# Patient Record
Sex: FEMALE | Race: WHITE | NOT HISPANIC OR LATINO | Employment: OTHER | URBAN - METROPOLITAN AREA
[De-identification: names, ages, dates, MRNs, and addresses within clinical notes are randomized per-mention and may not be internally consistent; named-entity substitution may affect disease eponyms.]

---

## 2016-07-06 LAB — PAP SMEAR: NORMAL

## 2017-02-17 RX ORDER — HYDROCHLOROTHIAZIDE 25 MG/1
25 TABLET ORAL DAILY
Qty: 90 TABLET | Refills: 2 | Status: SHIPPED | OUTPATIENT
Start: 2017-02-17 | End: 2017-12-08 | Stop reason: SDUPTHER

## 2017-02-20 ENCOUNTER — OFFICE VISIT (OUTPATIENT)
Dept: FAMILY MEDICINE | Facility: CLINIC | Age: 42
End: 2017-02-20
Payer: COMMERCIAL

## 2017-02-20 VITALS
WEIGHT: 223.75 LBS | TEMPERATURE: 98 F | RESPIRATION RATE: 20 BRPM | DIASTOLIC BLOOD PRESSURE: 85 MMHG | HEIGHT: 64 IN | BODY MASS INDEX: 38.2 KG/M2 | SYSTOLIC BLOOD PRESSURE: 127 MMHG | HEART RATE: 91 BPM

## 2017-02-20 DIAGNOSIS — E78.5 HYPERLIPIDEMIA, UNSPECIFIED HYPERLIPIDEMIA TYPE: ICD-10-CM

## 2017-02-20 DIAGNOSIS — E88.819 INSULIN RESISTANCE: ICD-10-CM

## 2017-02-20 DIAGNOSIS — F41.0 PANIC ATTACK: ICD-10-CM

## 2017-02-20 DIAGNOSIS — I10 ESSENTIAL HYPERTENSION: ICD-10-CM

## 2017-02-20 DIAGNOSIS — Q61.3 POLYCYSTIC KIDNEY DISEASE: ICD-10-CM

## 2017-02-20 DIAGNOSIS — F33.1 MAJOR DEPRESSIVE DISORDER, RECURRENT, MODERATE: ICD-10-CM

## 2017-02-20 PROCEDURE — 99214 OFFICE O/P EST MOD 30 MIN: CPT | Mod: S$GLB,,, | Performed by: INTERNAL MEDICINE

## 2017-02-20 RX ORDER — ALPRAZOLAM 0.5 MG/1
0.5 TABLET ORAL DAILY PRN
Qty: 20 TABLET | Refills: 0 | Status: SHIPPED | OUTPATIENT
Start: 2017-02-20 | End: 2017-03-30 | Stop reason: SDUPTHER

## 2017-02-20 RX ORDER — BUPROPION HYDROCHLORIDE 150 MG/1
150 TABLET ORAL DAILY
Qty: 30 TABLET | Refills: 11 | Status: SHIPPED | OUTPATIENT
Start: 2017-02-20 | End: 2017-03-20
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
## 2017-03-03 ENCOUNTER — PATIENT OUTREACH (OUTPATIENT)
Dept: ADMINISTRATIVE | Facility: HOSPITAL | Age: 42
End: 2017-03-03

## 2017-03-03 NOTE — PROGRESS NOTES
PRE-VISIT CHART REVIEW    Appointment Scheduled on 3/20/17    Department stratifications & guidelines reviewed:yes    Target Chronic Diagnosis: HTN, obesity    Chronic Diagnosis Intervention Due: no    Goals Updated:No    Health Maintenance Due   Topic Date Due    TETANUS VACCINE  03/13/1993       Advanced Directives:   65 years of age or older?  No  Directive on file?  N\A                                      Pre-visit patient communication: 03/03/2017 MyChart/Letter    Studies or screenings scheduled pre-visit: no    Educate patient on HTN (127/85), obesity (38.41)

## 2017-03-07 ENCOUNTER — PATIENT MESSAGE (OUTPATIENT)
Dept: ADMINISTRATIVE | Facility: HOSPITAL | Age: 42
End: 2017-03-07

## 2017-03-07 ENCOUNTER — OFFICE VISIT (OUTPATIENT)
Dept: PSYCHIATRY | Facility: CLINIC | Age: 42
End: 2017-03-07
Payer: COMMERCIAL

## 2017-03-07 DIAGNOSIS — F41.0 PANIC ATTACKS: ICD-10-CM

## 2017-03-07 DIAGNOSIS — F43.0 ACUTE STRESS DISORDER: Primary | ICD-10-CM

## 2017-03-07 PROCEDURE — 90791 PSYCH DIAGNOSTIC EVALUATION: CPT | Mod: S$GLB,,, | Performed by: PSYCHOLOGIST
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
## 2017-03-14 ENCOUNTER — CLINICAL SUPPORT (OUTPATIENT)
Dept: FAMILY MEDICINE | Facility: CLINIC | Age: 42
End: 2017-03-14
Payer: COMMERCIAL

## 2017-03-14 ENCOUNTER — APPOINTMENT (OUTPATIENT)
Dept: LAB | Facility: HOSPITAL | Age: 42
End: 2017-03-14
Attending: INTERNAL MEDICINE
Payer: COMMERCIAL

## 2017-03-14 DIAGNOSIS — F33.1 MAJOR DEPRESSIVE DISORDER, RECURRENT, MODERATE: ICD-10-CM

## 2017-03-14 DIAGNOSIS — I10 ESSENTIAL HYPERTENSION: ICD-10-CM

## 2017-03-14 DIAGNOSIS — E88.819 INSULIN RESISTANCE: ICD-10-CM

## 2017-03-14 DIAGNOSIS — Q61.3 POLYCYSTIC KIDNEY DISEASE: ICD-10-CM

## 2017-03-14 LAB
ALBUMIN SERPL BCP-MCNC: 3.8 G/DL
ALP SERPL-CCNC: 72 U/L
ALT SERPL W/O P-5'-P-CCNC: 15 U/L
ANION GAP SERPL CALC-SCNC: 11 MMOL/L
AST SERPL-CCNC: 14 U/L
BASOPHILS # BLD AUTO: 0 K/UL
BASOPHILS NFR BLD: 0.4 %
BILIRUB SERPL-MCNC: 0.7 MG/DL
BUN SERPL-MCNC: 16 MG/DL
CALCIUM SERPL-MCNC: 9.8 MG/DL
CHLORIDE SERPL-SCNC: 104 MMOL/L
CHOLEST/HDLC SERPL: 5.1 {RATIO}
CO2 SERPL-SCNC: 26 MMOL/L
CREAT SERPL-MCNC: 1.3 MG/DL
DIFFERENTIAL METHOD: ABNORMAL
EOSINOPHIL # BLD AUTO: 0.1 K/UL
EOSINOPHIL NFR BLD: 1.5 %
ERYTHROCYTE [DISTWIDTH] IN BLOOD BY AUTOMATED COUNT: 16.1 %
EST. GFR  (AFRICAN AMERICAN): 58 ML/MIN/1.73 M^2
EST. GFR  (NON AFRICAN AMERICAN): 51 ML/MIN/1.73 M^2
GLUCOSE SERPL-MCNC: 122 MG/DL
HCT VFR BLD AUTO: 35.3 %
HDL/CHOLESTEROL RATIO: 19.5 %
HDLC SERPL-MCNC: 195 MG/DL
HDLC SERPL-MCNC: 38 MG/DL
HGB BLD-MCNC: 11.4 G/DL
LDLC SERPL CALC-MCNC: 133.4 MG/DL
LYMPHOCYTES # BLD AUTO: 1.5 K/UL
LYMPHOCYTES NFR BLD: 20.4 %
MCH RBC QN AUTO: 26.3 PG
MCHC RBC AUTO-ENTMCNC: 32.4 %
MCV RBC AUTO: 81 FL
MONOCYTES # BLD AUTO: 0.9 K/UL
MONOCYTES NFR BLD: 11.9 %
NEUTROPHILS # BLD AUTO: 4.8 K/UL
NEUTROPHILS NFR BLD: 65.8 %
NONHDLC SERPL-MCNC: 157 MG/DL
PLATELET # BLD AUTO: 302 K/UL
PMV BLD AUTO: 10.1 FL
POTASSIUM SERPL-SCNC: 3.4 MMOL/L
PROT SERPL-MCNC: 7.6 G/DL
RBC # BLD AUTO: 4.35 M/UL
SODIUM SERPL-SCNC: 141 MMOL/L
TRIGL SERPL-MCNC: 118 MG/DL
TSH SERPL DL<=0.005 MIU/L-ACNC: 2.7 UIU/ML
WBC # BLD AUTO: 7.3 K/UL

## 2017-03-14 PROCEDURE — 80061 LIPID PANEL: CPT

## 2017-03-14 PROCEDURE — 80053 COMPREHEN METABOLIC PANEL: CPT

## 2017-03-14 PROCEDURE — 85025 COMPLETE CBC W/AUTO DIFF WBC: CPT

## 2017-03-14 PROCEDURE — 84443 ASSAY THYROID STIM HORMONE: CPT

## 2017-03-14 PROCEDURE — 83036 HEMOGLOBIN GLYCOSYLATED A1C: CPT

## 2017-03-15 LAB
ESTIMATED AVG GLUCOSE: 128 MG/DL
HBA1C MFR BLD HPLC: 6.1 %

## 2017-03-20 ENCOUNTER — OFFICE VISIT (OUTPATIENT)
Dept: FAMILY MEDICINE | Facility: CLINIC | Age: 42
End: 2017-03-20
Payer: COMMERCIAL

## 2017-03-20 VITALS
HEIGHT: 64 IN | OXYGEN SATURATION: 98 % | DIASTOLIC BLOOD PRESSURE: 74 MMHG | WEIGHT: 225.5 LBS | TEMPERATURE: 98 F | BODY MASS INDEX: 38.5 KG/M2 | RESPIRATION RATE: 18 BRPM | SYSTOLIC BLOOD PRESSURE: 115 MMHG | HEART RATE: 79 BPM

## 2017-03-20 DIAGNOSIS — F41.0 PANIC ATTACK: ICD-10-CM

## 2017-03-20 DIAGNOSIS — F43.0 ACUTE STRESS DISORDER: Primary | ICD-10-CM

## 2017-03-20 DIAGNOSIS — F32.0 MAJOR DEPRESSIVE DISORDER, SINGLE EPISODE, MILD: ICD-10-CM

## 2017-03-20 DIAGNOSIS — B00.2 HERPES VIRUS INFECTION OF ORAL MUCOSA: ICD-10-CM

## 2017-03-20 DIAGNOSIS — D50.9 IRON DEFICIENCY ANEMIA, UNSPECIFIED IRON DEFICIENCY ANEMIA TYPE: ICD-10-CM

## 2017-03-20 DIAGNOSIS — E78.5 HYPERLIPIDEMIA, UNSPECIFIED HYPERLIPIDEMIA TYPE: ICD-10-CM

## 2017-03-20 PROCEDURE — 99214 OFFICE O/P EST MOD 30 MIN: CPT | Mod: S$GLB,,, | Performed by: INTERNAL MEDICINE

## 2017-03-20 RX ORDER — FERROUS SULFATE 325(65) MG
325 TABLET ORAL
Refills: 0 | COMMUNITY
Start: 2017-03-20 | End: 2018-12-21

## 2017-03-20 RX ORDER — ROSUVASTATIN CALCIUM 10 MG/1
10 TABLET, COATED ORAL DAILY
Qty: 90 TABLET | Refills: 3 | Status: SHIPPED | OUTPATIENT
Start: 2017-03-20 | End: 2018-06-11 | Stop reason: SDUPTHER

## 2017-03-20 RX ORDER — VALACYCLOVIR HYDROCHLORIDE 1 G/1
1000 TABLET, FILM COATED ORAL 2 TIMES DAILY
Qty: 14 TABLET | Refills: 3 | Status: SHIPPED | OUTPATIENT
Start: 2017-03-20 | End: 2018-03-12 | Stop reason: SDUPTHER

## 2017-03-20 RX ORDER — BUPROPION HYDROCHLORIDE 300 MG/1
300 TABLET ORAL DAILY
Qty: 30 TABLET | Refills: 11 | Status: SHIPPED | OUTPATIENT
Start: 2017-03-20 | End: 2017-03-25

## 2017-03-20 NOTE — PROGRESS NOTES
Subjective:       Patient ID: Anyi Moore is a 42 y.o. female.    Current Outpatient Prescriptions   Medication Sig Dispense Refill    alprazolam (XANAX) 0.5 MG tablet Take 1 tablet (0.5 mg total) by mouth daily as needed for Anxiety. Panic attacks 20 tablet 0    diphenhydrAMINE (BENADRYL) 25 mg capsule Take 1 each (25 mg total) by mouth every 6 (six) hours as needed for Itching.  0    fish oil-omega-3 fatty acids 300-1,000 mg capsule Take 2 g by mouth once daily.      hydrochlorothiazide (HYDRODIURIL) 25 MG tablet Take 1 tablet (25 mg total) by mouth once daily. 90 tablet 2    labetalol (NORMODYNE) 100 MG tablet Take 100 mg by mouth 2 (two) times daily.      losartan (COZAAR) 50 MG tablet Take 1 tablet (50 mg total) by mouth once daily. 90 tablet 3    metformin (GLUCOPHAGE) 500 MG tablet Take 1 tablet (500 mg total) by mouth daily with breakfast. 90 tablet 3    multivitamin (THERAGRAN) per tablet Take 1 tablet by mouth once daily.      buPROPion (WELLBUTRIN XL) 300 MG 24 hr tablet Take 1 tablet (300 mg total) by mouth once daily. 30 tablet 11    ferrous sulfate 325 mg (65 mg iron) Tab tablet Take 1 tablet (325 mg total) by mouth daily with breakfast.  0    rosuvastatin (CRESTOR) 10 MG tablet Take 1 tablet (10 mg total) by mouth once daily. 90 tablet 3    valacyclovir (VALTREX) 1000 MG tablet Take 1 tablet (1,000 mg total) by mouth 2 (two) times daily. 14 tablet 3     No current facility-administered medications for this visit.      Chief Complaint: Follow-up, 4 week and Results, Medication Management  She is here today to f/u on her severe anxiety and depression due to an acute stress in her family.  She is now followed by psychology.  She was started on wellbutrin 4 weeks ago and says it is helping a little.  She is still very anxious and cries all the time.  No suicidal ideations.  She is sleeping all the time and will overeat then not eat at all.  She had only one panic attack that last for  "about 10 minutes and resolved after taking xanax.  She is still living apart from her  and 2 adopted sons (she is living with her 10 yo daughter at her parent's house).      She has labs done and noted to have elevated lipids to 195/118/38/133 despite taking red anibal yeast daily.      She was noted to be anemic with H+H of 11/35 due to heavy periods. This is stable. She is not currently taking iron.      Over the weekend she developed a herpes cold sore on her lower lip that she took some left over valtrex. It resolved in 3 days and she would like a refill on hand incase she gets another lesion.      Review of Systems   Constitutional: Negative for appetite change, fatigue, fever and unexpected weight change.   HENT: Negative for congestion, ear pain, hearing loss, sore throat and trouble swallowing.    Eyes: Negative for pain and visual disturbance.   Respiratory: Negative for cough, chest tightness, shortness of breath and wheezing.    Cardiovascular: Negative for chest pain, palpitations and leg swelling.   Gastrointestinal: Negative for abdominal pain, blood in stool, constipation, diarrhea, nausea and vomiting.   Endocrine: Negative for polyuria.   Genitourinary: Negative for dysuria and hematuria.   Musculoskeletal: Negative for arthralgias, back pain and myalgias.   Skin: Negative for rash.   Neurological: Negative for dizziness, weakness, numbness and headaches.   Hematological: Does not bruise/bleed easily.   Psychiatric/Behavioral: Positive for dysphoric mood and sleep disturbance. Negative for suicidal ideas. The patient is nervous/anxious.        Objective:      Vitals:    03/20/17 0846   BP: 115/74   BP Location: Right arm   Patient Position: Sitting   BP Method: Manual   Pulse: 79   Resp: 18   Temp: 98.3 °F (36.8 °C)   TempSrc: Oral   SpO2: 98%   Weight: 102.3 kg (225 lb 8.5 oz)   Height: 5' 4" (1.626 m)     Body mass index is 38.71 kg/(m^2).  Physical Exam    General appearance: No acute " distress, cooperative  Neck: FROM, soft, supple, no thyromegaly  Lymph: no anterior or posterior cervical adenopathy  Heart::  Regular rate and rhythm, no murmur  Lung: Clear to ascultation bilaterally, no wheezing, no rales, no rhonchi, no distress  Abdomen: Soft, nontender, no distention, no hepatosplenomegaly, bowel sounds normal, no guarding, no rebound, no peritoneal signs  Skin: no rashes, mild lesion on lower lip on right healing  Extremities: no edema, no cyanosis  Peripheral pulses: 2+ pedal pulses bilaterally, good perfusion and color    Assessment:       1. Acute stress disorder    2. Major depressive disorder, single episode, mild    3. Panic attack    4. Iron deficiency anemia, unspecified iron deficiency anemia type    5. Hyperlipidemia, unspecified hyperlipidemia type    6. Herpes virus infection of oral mucosa        Plan:       Acute stress disorder with major depression and panic attacks  Uncontrolled but mildly improved. Will increase wellbutrin to 300 mg qday.  Recheck by email/phone in about one month. She will call me if any issues.  She is using xanax only for panic attacks and does not need a refill today.    -     buPROPion (WELLBUTRIN XL) 300 MG 24 hr tablet; Take 1 tablet (300 mg total) by mouth once daily.  Dispense: 30 tablet; Refill: 11    Iron deficiency anemia, unspecified iron deficiency anemia type  Stable but still low and will start OTC iron supplements and recheck studies in 3 months.   -     CBC auto differential; Future; Expected date: 6/20/17  -     Iron and TIBC; Future; Expected date: 6/20/17  -     Ferritin; Future; Expected date: 6/20/17  -     ferrous sulfate 325 mg (65 mg iron) Tab tablet; Take 1 tablet (325 mg total) by mouth daily with breakfast.; Refill: 0    Hyperlipidemia, unspecified hyperlipidemia type  Elevated with LDL of 138.  Will start crestor and recheck in 3 months.   -     rosuvastatin (CRESTOR) 10 MG tablet; Take 1 tablet (10 mg total) by mouth once  daily.  Dispense: 90 tablet; Refill: 3  -     Lipid panel; Future; Expected date: 6/20/17  -     Comprehensive metabolic panel; Future; Expected date: 6/20/17    Herpes virus infection of oral mucosa  Oral lesion is healing. Rx given to take for next outbreak.    -     valacyclovir (VALTREX) 1000 MG tablet; Take 1 tablet (1,000 mg total) by mouth 2 (two) times daily.  Dispense: 14 tablet; Refill: 3    Return in about 3 months (around 6/20/2017) for chronic medical issues.

## 2017-03-21 ENCOUNTER — TELEPHONE (OUTPATIENT)
Dept: FAMILY MEDICINE | Facility: CLINIC | Age: 42
End: 2017-03-21

## 2017-03-21 NOTE — TELEPHONE ENCOUNTER
Attempted to reach patient to schedule fasting labs and 3 month F/U with Dr. Valdez. No answer, LM including contact information for return call/scheduling.

## 2017-03-22 ENCOUNTER — PATIENT MESSAGE (OUTPATIENT)
Dept: FAMILY MEDICINE | Facility: CLINIC | Age: 42
End: 2017-03-22

## 2017-03-22 ENCOUNTER — OFFICE VISIT (OUTPATIENT)
Dept: PSYCHIATRY | Facility: CLINIC | Age: 42
End: 2017-03-22
Payer: COMMERCIAL

## 2017-03-22 DIAGNOSIS — F43.10 POST TRAUMATIC STRESS DISORDER (PTSD): ICD-10-CM

## 2017-03-22 DIAGNOSIS — F32.0 MAJOR DEPRESSIVE DISORDER, SINGLE EPISODE, MILD: Primary | ICD-10-CM

## 2017-03-22 DIAGNOSIS — F41.0 PANIC ATTACKS: ICD-10-CM

## 2017-03-22 PROCEDURE — 90834 PSYTX W PT 45 MINUTES: CPT | Mod: S$GLB,,, | Performed by: PSYCHOLOGIST

## 2017-03-22 NOTE — TELEPHONE ENCOUNTER
Attempted to reach patient via contact number provided, no answer. Patient is scheduled for 3 month F/U and fasting labs, notified patient via "[employer]t.

## 2017-03-23 PROBLEM — F43.10 POST TRAUMATIC STRESS DISORDER (PTSD): Status: ACTIVE | Noted: 2017-03-20

## 2017-03-23 NOTE — PROGRESS NOTES
Individual Psychotherapy (PhD/LCSW)    3/22/2017    Site:  [facility]         Therapeutic Intervention: Met with patient.  Outpatient - Insight oriented psychotherapy 45 min - CPT code 27648    Chief complaint/reason for encounter: depression and anxiety     Interval history and content of current session:  Ms. Moore arrived on time for her scheduled appointment.  She reports that her trauma-related symptoms have persisted.  Because her symptoms have persisted for greater than one month, they currently meet criteria for Posttraumatic Stress Disorder.  She continues to endorse intrusive symptoms, avoidance, negative alterations in cognitions and mood, and hyperarousal.  Additionally, it appears her depressive symptoms may have extended beyond the context of trauma to meet criteria for Major Depressive Disorder, single episode, mild.  She experiences depressed mood, anhedonia, lack of motivation, fatigue, and difficulty concentrating both related and unrelated to trauma.  She also continues to experience intermittent panic attacks; however, she does not meet criteria for Panic Disorder because she does not exhibit avoidance due to fear of future panic attacks.  She continues to live with her parents and her daughter.  She notes that it is very difficult for her because she feels lonely and unable to draw support from others due to the nature of the trauma.  She was provided with psychoeducation about PTSD, cognitive triad, and stuck points.  She generated a list of stuck points and was actively engaged in discussing a recent distressing event using the ABCD worksheet.  She found the worksheet helpful and was encouraged to do one worksheet per day.  She was also encouraged to engage in relaxation and self-care.  She notes that she will spend time with her  tonight and will focus on self-care.    Treatment plan:  · Target symptoms: depression, anxiety   · Why chosen therapy is appropriate versus another  modality: relevant to diagnosis, evidence based practice  · Outcome monitoring methods: self-report  · Therapeutic intervention type: insight oriented psychotherapy    Risk parameters:  Patient reports no suicidal ideation  Patient reports no homicidal ideation  Patient reports no self-injurious behavior  Patient reports no violent behavior    Verbal deficits: None    Patient's response to intervention:  The patient's response to intervention is accepting.    Progress toward goals and other mental status changes:  The patient's progress toward goals is fair .    Diagnosis:     ICD-10-CM ICD-9-CM   1. Major depressive disorder, single episode, mild F32.0 296.21   2. Post traumatic stress disorder (PTSD) F43.10 309.81   3. Panic attacks F41.0 300.01       Plan:  individual psychotherapy    Return to clinic: 2 weeks    Length of Service (minutes): 45

## 2017-03-24 ENCOUNTER — TELEPHONE (OUTPATIENT)
Dept: PSYCHIATRY | Facility: CLINIC | Age: 42
End: 2017-03-24

## 2017-03-24 ENCOUNTER — PATIENT MESSAGE (OUTPATIENT)
Dept: FAMILY MEDICINE | Facility: CLINIC | Age: 42
End: 2017-03-24
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
## 2017-03-24 NOTE — TELEPHONE ENCOUNTER
Called patient to offer appointment, no answer, left voicemail to contact office. Phone number provided. Thanks  Kari

## 2017-03-25 RX ORDER — CITALOPRAM 20 MG/1
20 TABLET, FILM COATED ORAL DAILY
Qty: 30 TABLET | Refills: 11 | Status: SHIPPED | OUTPATIENT
Start: 2017-03-25 | End: 2018-03-29

## 2017-03-30 DIAGNOSIS — F41.0 PANIC ATTACK: ICD-10-CM

## 2017-03-31 RX ORDER — ALPRAZOLAM 0.5 MG/1
0.5 TABLET ORAL DAILY PRN
Qty: 20 TABLET | Refills: 0 | Status: SHIPPED | OUTPATIENT
Start: 2017-03-31 | End: 2020-03-30 | Stop reason: SDUPTHER

## 2017-04-17 RX ORDER — LABETALOL 100 MG/1
100 TABLET, FILM COATED ORAL 2 TIMES DAILY
Qty: 180 TABLET | Refills: 2 | Status: SHIPPED | OUTPATIENT
Start: 2017-04-17 | End: 2018-04-26 | Stop reason: SDUPTHER

## 2017-04-17 RX ORDER — LOSARTAN POTASSIUM 50 MG/1
50 TABLET ORAL DAILY
Qty: 90 TABLET | Refills: 3 | Status: SHIPPED | OUTPATIENT
Start: 2017-04-17 | End: 2018-06-19 | Stop reason: SDUPTHER

## 2017-05-12 ENCOUNTER — OFFICE VISIT (OUTPATIENT)
Dept: OBSTETRICS AND GYNECOLOGY | Facility: CLINIC | Age: 42
End: 2017-05-12
Payer: COMMERCIAL

## 2017-05-12 VITALS
WEIGHT: 227.94 LBS | DIASTOLIC BLOOD PRESSURE: 85 MMHG | BODY MASS INDEX: 38.91 KG/M2 | HEIGHT: 64 IN | SYSTOLIC BLOOD PRESSURE: 126 MMHG | HEART RATE: 80 BPM

## 2017-05-12 DIAGNOSIS — N92.1 MENORRHAGIA WITH IRREGULAR CYCLE: Primary | ICD-10-CM

## 2017-05-12 PROCEDURE — 99212 OFFICE O/P EST SF 10 MIN: CPT | Mod: S$GLB,,, | Performed by: NURSE PRACTITIONER

## 2017-05-12 PROCEDURE — 99999 PR PBB SHADOW E&M-EST. PATIENT-LVL III: CPT | Mod: PBBFAC,,, | Performed by: NURSE PRACTITIONER

## 2017-05-16 NOTE — PROGRESS NOTES
Chief Complaint   Patient presents with    New Patient       History of Present Illness: Anyi Moore is a 42 y.o. female that presents today  for 5/12/17  Pt here c/o heavy bleeding with irregular cycles. She reports that she has been seen for this issue before and 2 years ago she started getting depo injections to help with the bleeding, etc. She was on the depo injections for 1 year and still had bleeding issues. She has now been off of the injections for 1 year and is ready to discuss more permamnent options to control her cycles. She reports that she gets a cycle about every 6 weeks or so and the bleeding is 5 days, with the first 3 days being extremely heavy. No other associated complaints. Pt denies any other complaints or concerns.       Chief Complaint   Patient presents with    New Patient         Past Medical History:   Diagnosis Date    Hypertension     Lymphadenopathy 7/29/2016    Polycystic kidney        Past Surgical History:   Procedure Laterality Date    DILATION AND CURETTAGE OF UTERUS      x4       Current Outpatient Prescriptions   Medication Sig Dispense Refill    citalopram (CELEXA) 20 MG tablet Take 1 tablet (20 mg total) by mouth once daily. 30 tablet 11    diphenhydrAMINE (BENADRYL) 25 mg capsule Take 1 each (25 mg total) by mouth every 6 (six) hours as needed for Itching.  0    ferrous sulfate 325 mg (65 mg iron) Tab tablet Take 1 tablet (325 mg total) by mouth daily with breakfast.  0    fish oil-omega-3 fatty acids 300-1,000 mg capsule Take 2 g by mouth once daily.      hydrochlorothiazide (HYDRODIURIL) 25 MG tablet Take 1 tablet (25 mg total) by mouth once daily. 90 tablet 2    labetalol (NORMODYNE) 100 MG tablet Take 1 tablet (100 mg total) by mouth 2 (two) times daily. 180 tablet 2    losartan (COZAAR) 50 MG tablet Take 1 tablet (50 mg total) by mouth once daily. 90 tablet 3    metformin (GLUCOPHAGE) 500 MG tablet Take 1 tablet (500 mg total) by mouth daily with  breakfast. 90 tablet 3    multivitamin (THERAGRAN) per tablet Take 1 tablet by mouth once daily.      rosuvastatin (CRESTOR) 10 MG tablet Take 1 tablet (10 mg total) by mouth once daily. 90 tablet 3    alprazolam (XANAX) 0.5 MG tablet Take 1 tablet (0.5 mg total) by mouth daily as needed for Anxiety. Panic attacks 20 tablet 0    valacyclovir (VALTREX) 1000 MG tablet Take 1 tablet (1,000 mg total) by mouth 2 (two) times daily. 14 tablet 3     No current facility-administered medications for this visit.        Review of patient's allergies indicates:   Allergen Reactions    Wellbutrin [bupropion hcl] Hives    Percocet [oxycodone-acetaminophen] Other (See Comments)     Causes confusion       Family History   Problem Relation Age of Onset    Adopted: Yes    Cancer Mother      lung    Early death Mother 46    Hypertension Mother     Kidney disease Mother      polycystic    No Known Problems Daughter     No Known Problems Father     No Known Problems Sister     No Known Problems Brother     No Known Problems Maternal Aunt     No Known Problems Maternal Uncle     No Known Problems Paternal Aunt     No Known Problems Paternal Uncle     Kidney disease Maternal Grandmother      polycystic    No Known Problems Maternal Grandfather     No Known Problems Paternal Grandmother     No Known Problems Paternal Grandfather        Social History   Substance Use Topics    Smoking status: Never Smoker    Smokeless tobacco: Never Used    Alcohol use No      Comment: social       OB History    Para Term  AB SAB TAB Ectopic Multiple Living   1 1        1      # Outcome Date GA Lbr Dionisio/2nd Weight Sex Delivery Anes PTL Lv   1 Para     F Vag-Spont             Review of Symptoms:  GENERAL: Denies weight gain or weight loss. Feeling well overall.   SKIN: Denies rash or lesions.   ABDOMEN: No abdominal pain, constipation, diarrhea, nausea, vomiting or rectal bleeding.   URINARY: No frequency, dysuria,  "hematuria, or burning on urination.      /85 (BP Location: Right arm, Patient Position: Sitting, BP Method: Automatic)  Pulse 80  Ht 5' 4" (1.626 m)  Wt 103.4 kg (227 lb 15.3 oz)  LMP 05/11/2017 (Approximate)  BMI 39.13 kg/m2    ASSESSMENT/PLAN:  Menorrhagia with irregular cycle          -Lengthy discussion with ptt about long term birth control options to control cycle vs ablation, etc... Pt reports that she has though about doing an IUD (mirena), but after much thought she would rather a more permanent option such as an ablation or possibly hysterectomy, which she states she has discussed with Dr. Ma before, but is now ready to move forward with one of these options. Informed pt that she would need a surgical consult with Dr. Ma at this point. Pt okay with this and verbalized understanding.     Follow-up:  RTC for appt with Dr. Ma  "

## 2017-05-22 ENCOUNTER — DOCUMENTATION ONLY (OUTPATIENT)
Dept: PSYCHIATRY | Facility: CLINIC | Age: 42
End: 2017-05-22

## 2017-06-05 ENCOUNTER — PATIENT OUTREACH (OUTPATIENT)
Dept: ADMINISTRATIVE | Facility: HOSPITAL | Age: 42
End: 2017-06-05

## 2017-06-14 ENCOUNTER — APPOINTMENT (OUTPATIENT)
Dept: LAB | Facility: HOSPITAL | Age: 42
End: 2017-06-14
Attending: INTERNAL MEDICINE
Payer: COMMERCIAL

## 2017-06-14 ENCOUNTER — CLINICAL SUPPORT (OUTPATIENT)
Dept: FAMILY MEDICINE | Facility: CLINIC | Age: 42
End: 2017-06-14
Payer: COMMERCIAL

## 2017-06-14 DIAGNOSIS — E78.5 HYPERLIPIDEMIA, UNSPECIFIED HYPERLIPIDEMIA TYPE: ICD-10-CM

## 2017-06-14 DIAGNOSIS — D50.9 IRON DEFICIENCY ANEMIA, UNSPECIFIED IRON DEFICIENCY ANEMIA TYPE: ICD-10-CM

## 2017-06-14 LAB
ALBUMIN SERPL BCP-MCNC: 3.7 G/DL
ALP SERPL-CCNC: 60 U/L
ALT SERPL W/O P-5'-P-CCNC: 17 U/L
ANION GAP SERPL CALC-SCNC: 10 MMOL/L
AST SERPL-CCNC: 14 U/L
BASOPHILS # BLD AUTO: 0 K/UL
BASOPHILS NFR BLD: 0.2 %
BILIRUB SERPL-MCNC: 0.5 MG/DL
BUN SERPL-MCNC: 17 MG/DL
CALCIUM SERPL-MCNC: 9.8 MG/DL
CHLORIDE SERPL-SCNC: 103 MMOL/L
CHOLEST/HDLC SERPL: 3.4 {RATIO}
CO2 SERPL-SCNC: 27 MMOL/L
CREAT SERPL-MCNC: 1.2 MG/DL
DIFFERENTIAL METHOD: ABNORMAL
EOSINOPHIL # BLD AUTO: 0.1 K/UL
EOSINOPHIL NFR BLD: 1.4 %
ERYTHROCYTE [DISTWIDTH] IN BLOOD BY AUTOMATED COUNT: 15.1 %
EST. GFR  (AFRICAN AMERICAN): >60 ML/MIN/1.73 M^2
EST. GFR  (NON AFRICAN AMERICAN): 56 ML/MIN/1.73 M^2
FERRITIN SERPL-MCNC: 16 NG/ML
GLUCOSE SERPL-MCNC: 114 MG/DL
HCT VFR BLD AUTO: 37.4 %
HDL/CHOLESTEROL RATIO: 29.1 %
HDLC SERPL-MCNC: 151 MG/DL
HDLC SERPL-MCNC: 44 MG/DL
HGB BLD-MCNC: 12.4 G/DL
IRON SERPL-MCNC: 49 UG/DL
LDLC SERPL CALC-MCNC: 76.8 MG/DL
LYMPHOCYTES # BLD AUTO: 2.3 K/UL
LYMPHOCYTES NFR BLD: 25.7 %
MCH RBC QN AUTO: 27.7 PG
MCHC RBC AUTO-ENTMCNC: 33 %
MCV RBC AUTO: 84 FL
MONOCYTES # BLD AUTO: 0.7 K/UL
MONOCYTES NFR BLD: 7.5 %
NEUTROPHILS # BLD AUTO: 5.9 K/UL
NEUTROPHILS NFR BLD: 65.2 %
NONHDLC SERPL-MCNC: 107 MG/DL
PLATELET # BLD AUTO: 282 K/UL
PMV BLD AUTO: 10.1 FL
POTASSIUM SERPL-SCNC: 3.9 MMOL/L
PROT SERPL-MCNC: 7.4 G/DL
RBC # BLD AUTO: 4.47 M/UL
SATURATED IRON: 11 %
SODIUM SERPL-SCNC: 140 MMOL/L
TOTAL IRON BINDING CAPACITY: 432 UG/DL
TRANSFERRIN SERPL-MCNC: 292 MG/DL
TRIGL SERPL-MCNC: 151 MG/DL
WBC # BLD AUTO: 9.1 K/UL

## 2017-06-14 PROCEDURE — 84466 ASSAY OF TRANSFERRIN: CPT

## 2017-06-14 PROCEDURE — 82728 ASSAY OF FERRITIN: CPT

## 2017-06-14 PROCEDURE — 80053 COMPREHEN METABOLIC PANEL: CPT

## 2017-06-14 PROCEDURE — 80061 LIPID PANEL: CPT

## 2017-06-14 PROCEDURE — 85025 COMPLETE CBC W/AUTO DIFF WBC: CPT

## 2017-06-19 ENCOUNTER — OFFICE VISIT (OUTPATIENT)
Dept: FAMILY MEDICINE | Facility: CLINIC | Age: 42
End: 2017-06-19
Payer: COMMERCIAL

## 2017-06-19 VITALS
DIASTOLIC BLOOD PRESSURE: 88 MMHG | WEIGHT: 227.31 LBS | TEMPERATURE: 99 F | BODY MASS INDEX: 38.81 KG/M2 | HEIGHT: 64 IN | SYSTOLIC BLOOD PRESSURE: 132 MMHG | OXYGEN SATURATION: 97 % | RESPIRATION RATE: 17 BRPM | HEART RATE: 62 BPM

## 2017-06-19 DIAGNOSIS — F32.0 MAJOR DEPRESSIVE DISORDER, SINGLE EPISODE, MILD: ICD-10-CM

## 2017-06-19 DIAGNOSIS — F43.10 POST TRAUMATIC STRESS DISORDER (PTSD): ICD-10-CM

## 2017-06-19 DIAGNOSIS — E88.819 INSULIN RESISTANCE: ICD-10-CM

## 2017-06-19 DIAGNOSIS — E66.9 OBESITY (BMI 35.0-39.9 WITHOUT COMORBIDITY): ICD-10-CM

## 2017-06-19 DIAGNOSIS — R73.09 ELEVATED GLUCOSE: ICD-10-CM

## 2017-06-19 DIAGNOSIS — N92.1 MENORRHAGIA WITH IRREGULAR CYCLE: ICD-10-CM

## 2017-06-19 DIAGNOSIS — E78.5 HYPERLIPIDEMIA, UNSPECIFIED HYPERLIPIDEMIA TYPE: ICD-10-CM

## 2017-06-19 DIAGNOSIS — D50.9 IRON DEFICIENCY ANEMIA, UNSPECIFIED IRON DEFICIENCY ANEMIA TYPE: ICD-10-CM

## 2017-06-19 DIAGNOSIS — Q61.3 POLYCYSTIC KIDNEY DISEASE: ICD-10-CM

## 2017-06-19 DIAGNOSIS — I10 ESSENTIAL HYPERTENSION: Primary | ICD-10-CM

## 2017-06-19 PROCEDURE — 99214 OFFICE O/P EST MOD 30 MIN: CPT | Mod: S$GLB,,, | Performed by: INTERNAL MEDICINE

## 2017-06-19 NOTE — PROGRESS NOTES
Subjective:       Patient ID: Anyi Moore is a 42 y.o. female.    Current Outpatient Prescriptions   Medication Sig Dispense Refill    alprazolam (XANAX) 0.5 MG tablet Take 1 tablet (0.5 mg total) by mouth daily as needed for Anxiety. Panic attacks 20 tablet 0    citalopram (CELEXA) 20 MG tablet Take 1 tablet (20 mg total) by mouth once daily. 30 tablet 11    ferrous sulfate 325 mg (65 mg iron) Tab tablet Take 1 tablet (325 mg total) by mouth daily with breakfast.  0    hydrochlorothiazide (HYDRODIURIL) 25 MG tablet Take 1 tablet (25 mg total) by mouth once daily. 90 tablet 2    labetalol (NORMODYNE) 100 MG tablet Take 1 tablet (100 mg total) by mouth 2 (two) times daily. (Patient taking differently: Take 100 mg by mouth once daily at 6am. ) 180 tablet 2    losartan (COZAAR) 50 MG tablet Take 1 tablet (50 mg total) by mouth once daily. 90 tablet 3    metformin (GLUCOPHAGE) 500 MG tablet Take 1 tablet (500 mg total) by mouth daily with breakfast. 90 tablet 3    multivitamin (THERAGRAN) per tablet Take 1 tablet by mouth once daily.      rosuvastatin (CRESTOR) 10 MG tablet Take 1 tablet (10 mg total) by mouth once daily. 90 tablet 3    diphenhydrAMINE (BENADRYL) 25 mg capsule Take 1 each (25 mg total) by mouth every 6 (six) hours as needed for Itching.  0    fish oil-omega-3 fatty acids 300-1,000 mg capsule Take 2 g by mouth once daily.      valacyclovir (VALTREX) 1000 MG tablet Take 1 tablet (1,000 mg total) by mouth 2 (two) times daily. 14 tablet 3     No current facility-administered medications for this visit.      Chief Complaint: Follow-up and Weight Gain  She is here today to f/u on chronic medical issues.     SHe was started on celexa 20 mg qday for depression and PTSD. She is following with psychiatry. She says she is doing very well. She feels her depression is improving.  She is sleeping well. She is not having panic attacks and has not needed for xanax.     She has a history of  hypertension due to PCKD. She has been on treatment for HTN for some time and is controlled on current regimen of labetalol, telmisartan, and HCTZ.  She started taking her labetolol daily.  Her PCKD was dx at age 32. She has had u/s in the past and is followed by Dr. Alvarez but has not been seen in over two yeasr. Per patient her renal function is still normal. She denies any pain or hematuria. Her MGM has PCKD.       She has mild hyperlipidemia with last lipids were 151/151/44/76 on crestor 10 mg qday.  She is tolerating well without any side effects.       She has insulin resistance with HbA1c of 6.1 on 3/2017 due to her PCKD. She continues on metformin once a day.  She does not check her glucose.         She has DWAIN due to heavy frequent periods. She was seen by gyn who recommended ablation vs REYNA. She has scheduled an ablation for this Fall.  Her most recent iron studies show iron sat of 11 and ferritin of 16 with normal H+H.  She is taking iron once a day but continues with heavy periods.        Pap---2015  Mammogram ---10/2016 neg  Tdap----at work  Influenza vaccine---10/2016.     Review of Systems   Constitutional: Negative for appetite change, fatigue, fever and unexpected weight change.   HENT: Negative for congestion, ear pain, hearing loss, sore throat and trouble swallowing.    Eyes: Negative for pain and visual disturbance.   Respiratory: Negative for cough, chest tightness, shortness of breath and wheezing.    Cardiovascular: Negative for chest pain, palpitations and leg swelling.   Gastrointestinal: Negative for abdominal pain, blood in stool, constipation, diarrhea, nausea and vomiting.   Endocrine: Negative for polyuria.   Genitourinary: Negative for dysuria and hematuria.   Musculoskeletal: Negative for arthralgias, back pain and myalgias.   Skin: Negative for rash.   Neurological: Negative for dizziness, weakness, numbness and headaches.   Hematological: Does not bruise/bleed easily.  "  Psychiatric/Behavioral: Positive for dysphoric mood. Negative for sleep disturbance and suicidal ideas. The patient is not nervous/anxious.        Objective:      Vitals:    06/19/17 0916   BP: 132/88   BP Location: Left arm   Patient Position: Sitting   BP Method: Manual   Pulse: 62   Resp: 17   Temp: 98.6 °F (37 °C)   TempSrc: Oral   SpO2: 97%   Weight: 103.1 kg (227 lb 4.7 oz)   Height: 5' 4" (1.626 m)     Body mass index is 39.01 kg/m².  Physical Exam    General appearance: No acute distress, cooperative  Neck: FROM, soft, supple, no thyromegaly, no bruits  Lymph: no anterior or posterior cervical adenopathy  Heart::  Regular rate and rhythm, no murmur  Lung: Clear to ascultation bilaterally, no wheezing, no rales, no rhonchi, no distress  Abdomen: Soft, nontender, no distention, no hepatosplenomegaly, bowel sounds normal, no guarding, no rebound, no peritoneal signs  Skin: no rashes, no lesions  Extremities: no edema, no cyanosis  Neuro: no focal abnormalities, strength 5/5 b/l UE and LE, 2+ DTRs b/l UE and LE, normal gait  Peripheral pulses: 2+ pedal pulses bilaterally, good perfusion and color  Musculoskeletal: FROM, good strenth, no tenderness  Joint: normal appearance, no swelling, no warmth, no deformity in all joints    Assessment:       1. Essential hypertension    2. Hyperlipidemia, unspecified hyperlipidemia type    3. Polycystic kidney disease    4. Insulin resistance    5. Elevated glucose    6. Obesity (BMI 35.0-39.9 without comorbidity)    7. Major depressive disorder, single episode, mild    8. Post traumatic stress disorder (PTSD)    9. Menorrhagia with irregular cycle    10. Iron deficiency anemia, unspecified iron deficiency anemia type        Plan:       Essential hypertension  Good control on this regimen.     Hyperlipidemia, unspecified hyperlipidemia type  Good control on crestor 10 mg qday. Her last LDL was 76.      Polycystic kidney disease  Stable renal function and will recheck in 6 " months. She needs to schedule an appt with nephrology and get a renal u/s.   -     CBC auto differential; Future; Expected date: 12/01/2017  -     Comprehensive metabolic panel; Future; Expected date: 12/01/2017    Insulin resistance with Elevated glucose  Stable on low dose once a day metformin. HbA1c of 6.1.  Recheck prior to her next visit.   -     Hemoglobin A1c; Future; Expected date: 12/01/2017    Obesity (BMI 35.0-39.9 without comorbidity)  Continue to follow. Discussed healthy eating and need for regular exercise.     Major depressive disorder, single episode, mild with Post traumatic stress disorder (PTSD)  Controlled on celexa and she has not needed any xanax for panic attacks. She is followed by psychiatry.     Menorrhagia with irregular cycle  Uncontrolled and she will schedule an ablation for Fall.     Iron deficiency anemia, unspecified iron deficiency anemia type  Uncontrolled due to continued heavy periods.  Increase iron to bid.    -     Iron and TIBC; Future; Expected date: 12/01/2017  -     Ferritin; Future; Expected date: 12/01/2017    Return in about 6 months (around 12/19/2017) for chronic medical issues.

## 2017-07-03 ENCOUNTER — PATIENT MESSAGE (OUTPATIENT)
Dept: FAMILY MEDICINE | Facility: CLINIC | Age: 42
End: 2017-07-03

## 2017-07-03 ENCOUNTER — HOSPITAL ENCOUNTER (OUTPATIENT)
Dept: RADIOLOGY | Facility: HOSPITAL | Age: 42
Discharge: HOME | End: 2017-07-03
Attending: INTERNAL MEDICINE
Payer: COMMERCIAL

## 2017-07-03 ENCOUNTER — TELEPHONE (OUTPATIENT)
Dept: FAMILY MEDICINE | Facility: CLINIC | Age: 42
End: 2017-07-03

## 2017-07-03 DIAGNOSIS — M25.571 ACUTE RIGHT ANKLE PAIN: Primary | ICD-10-CM

## 2017-07-03 DIAGNOSIS — M25.571 ACUTE RIGHT ANKLE PAIN: ICD-10-CM

## 2017-07-03 PROCEDURE — 73610 X-RAY EXAM OF ANKLE: CPT | Mod: 26,RT,, | Performed by: RADIOLOGY

## 2017-07-03 PROCEDURE — 73610 X-RAY EXAM OF ANKLE: CPT | Mod: TC,RT

## 2017-07-03 NOTE — TELEPHONE ENCOUNTER
Attempted to call to find out which ankle she twisted.     I will then order xray that needs to be done at [facility].    Thanks
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
## 2017-07-04 ENCOUNTER — PATIENT MESSAGE (OUTPATIENT)
Dept: FAMILY MEDICINE | Facility: CLINIC | Age: 42
End: 2017-07-04

## 2017-07-13 DIAGNOSIS — E11.9 DIABETES TYPE 2, CONTROLLED: ICD-10-CM

## 2017-07-14 RX ORDER — METFORMIN HYDROCHLORIDE 500 MG/1
TABLET ORAL
Qty: 90 TABLET | Refills: 3 | Status: SHIPPED | OUTPATIENT
Start: 2017-07-14 | End: 2018-08-09 | Stop reason: SDUPTHER

## 2017-10-31 ENCOUNTER — PATIENT MESSAGE (OUTPATIENT)
Dept: OBSTETRICS AND GYNECOLOGY | Facility: CLINIC | Age: 42
End: 2017-10-31

## 2017-12-06 ENCOUNTER — PATIENT MESSAGE (OUTPATIENT)
Dept: FAMILY MEDICINE | Facility: CLINIC | Age: 42
End: 2017-12-06

## 2017-12-06 NOTE — TELEPHONE ENCOUNTER
Notified pt appointment was rescheduled d/t provider not being in clinic. Informed pt should have been contacted. Pt stated will call back to reschedule appointment.

## 2017-12-08 RX ORDER — HYDROCHLOROTHIAZIDE 25 MG/1
TABLET ORAL
Qty: 90 TABLET | Refills: 2 | Status: SHIPPED | OUTPATIENT
Start: 2017-12-08 | End: 2018-08-18 | Stop reason: SDUPTHER

## 2018-03-12 ENCOUNTER — PATIENT MESSAGE (OUTPATIENT)
Dept: FAMILY MEDICINE | Facility: CLINIC | Age: 43
End: 2018-03-12

## 2018-03-12 DIAGNOSIS — B00.2 HERPES VIRUS INFECTION OF ORAL MUCOSA: ICD-10-CM

## 2018-03-12 RX ORDER — VALACYCLOVIR HYDROCHLORIDE 1 G/1
1000 TABLET, FILM COATED ORAL 2 TIMES DAILY
Qty: 14 TABLET | Refills: 3 | Status: SHIPPED | OUTPATIENT
Start: 2018-03-12 | End: 2018-06-11 | Stop reason: SDUPTHER

## 2018-03-29 ENCOUNTER — OFFICE VISIT (OUTPATIENT)
Dept: FAMILY MEDICINE | Facility: CLINIC | Age: 43
End: 2018-03-29
Payer: COMMERCIAL

## 2018-03-29 VITALS
OXYGEN SATURATION: 98 % | RESPIRATION RATE: 18 BRPM | HEART RATE: 80 BPM | TEMPERATURE: 98 F | BODY MASS INDEX: 38.58 KG/M2 | SYSTOLIC BLOOD PRESSURE: 118 MMHG | DIASTOLIC BLOOD PRESSURE: 86 MMHG | HEIGHT: 64 IN | WEIGHT: 226 LBS

## 2018-03-29 DIAGNOSIS — Q61.3 POLYCYSTIC KIDNEY DISEASE: Primary | ICD-10-CM

## 2018-03-29 DIAGNOSIS — Z12.31 ENCOUNTER FOR SCREENING MAMMOGRAM FOR MALIGNANT NEOPLASM OF BREAST: ICD-10-CM

## 2018-03-29 DIAGNOSIS — F32.0 MAJOR DEPRESSIVE DISORDER, SINGLE EPISODE, MILD: ICD-10-CM

## 2018-03-29 DIAGNOSIS — Z30.011 ORAL CONTRACEPTION INITIAL PRESCRIPTION: ICD-10-CM

## 2018-03-29 DIAGNOSIS — F41.0 PANIC ATTACK: ICD-10-CM

## 2018-03-29 DIAGNOSIS — E78.5 HYPERLIPIDEMIA, UNSPECIFIED HYPERLIPIDEMIA TYPE: ICD-10-CM

## 2018-03-29 DIAGNOSIS — Z12.4 SCREENING FOR CERVICAL CANCER: ICD-10-CM

## 2018-03-29 DIAGNOSIS — E88.819 INSULIN RESISTANCE: ICD-10-CM

## 2018-03-29 DIAGNOSIS — D50.9 IRON DEFICIENCY ANEMIA, UNSPECIFIED IRON DEFICIENCY ANEMIA TYPE: ICD-10-CM

## 2018-03-29 DIAGNOSIS — I10 ESSENTIAL HYPERTENSION: ICD-10-CM

## 2018-03-29 DIAGNOSIS — E66.01 CLASS 2 SEVERE OBESITY DUE TO EXCESS CALORIES WITH SERIOUS COMORBIDITY AND BODY MASS INDEX (BMI) OF 38.0 TO 38.9 IN ADULT: ICD-10-CM

## 2018-03-29 DIAGNOSIS — Z23 NEED FOR PROPHYLACTIC VACCINATION AGAINST STREPTOCOCCUS PNEUMONIAE (PNEUMOCOCCUS): ICD-10-CM

## 2018-03-29 PROCEDURE — 3074F SYST BP LT 130 MM HG: CPT | Mod: CPTII,S$GLB,, | Performed by: INTERNAL MEDICINE

## 2018-03-29 PROCEDURE — 90732 PPSV23 VACC 2 YRS+ SUBQ/IM: CPT | Mod: S$GLB,,, | Performed by: INTERNAL MEDICINE

## 2018-03-29 PROCEDURE — 99214 OFFICE O/P EST MOD 30 MIN: CPT | Mod: 25,S$GLB,, | Performed by: INTERNAL MEDICINE

## 2018-03-29 PROCEDURE — 90471 IMMUNIZATION ADMIN: CPT | Mod: S$GLB,,, | Performed by: INTERNAL MEDICINE

## 2018-03-29 PROCEDURE — 3079F DIAST BP 80-89 MM HG: CPT | Mod: CPTII,S$GLB,, | Performed by: INTERNAL MEDICINE

## 2018-03-29 RX ORDER — NORGESTIMATE AND ETHINYL ESTRADIOL 7DAYSX3 LO
1 KIT ORAL DAILY
Qty: 30 TABLET | Refills: 11 | Status: SHIPPED | OUTPATIENT
Start: 2018-03-29 | End: 2019-06-07 | Stop reason: SDUPTHER

## 2018-03-29 RX ORDER — ASPIRIN 81 MG/1
81 TABLET ORAL DAILY
Refills: 0 | COMMUNITY
Start: 2018-03-29 | End: 2022-01-28

## 2018-03-29 NOTE — PROGRESS NOTES
Subjective:       Patient ID: Anyi Moore is a 43 y.o. female.    Medication List with Changes/Refills   New Medications    ASPIRIN (ECOTRIN) 81 MG EC TABLET    Take 1 tablet (81 mg total) by mouth once daily.    NORGESTIMATE-ETHINYL ESTRADIOL (ORTHO TRI-CYCLEN LO) 0.18/0.215/0.25 MG-25 MCG TABLET    Take 1 tablet by mouth once daily.   Current Medications    ALPRAZOLAM (XANAX) 0.5 MG TABLET    Take 1 tablet (0.5 mg total) by mouth daily as needed for Anxiety. Panic attacks    DIPHENHYDRAMINE (BENADRYL) 25 MG CAPSULE    Take 1 each (25 mg total) by mouth every 6 (six) hours as needed for Itching.    FERROUS SULFATE 325 MG (65 MG IRON) TAB TABLET    Take 1 tablet (325 mg total) by mouth daily with breakfast.    FISH OIL-OMEGA-3 FATTY ACIDS 300-1,000 MG CAPSULE    Take 2 g by mouth once daily.    HYDROCHLOROTHIAZIDE (HYDRODIURIL) 25 MG TABLET    TAKE ONE TABLET BY MOUTH ONCE DAILY    LABETALOL (NORMODYNE) 100 MG TABLET    Take 1 tablet (100 mg total) by mouth 2 (two) times daily.    LOSARTAN (COZAAR) 50 MG TABLET    Take 1 tablet (50 mg total) by mouth once daily.    METFORMIN (GLUCOPHAGE) 500 MG TABLET    TAKE ONE TABLET BY MOUTH ONCE DAILY WITH BREAKFAST    MULTIVITAMIN (THERAGRAN) PER TABLET    Take 1 tablet by mouth once daily.    ROSUVASTATIN (CRESTOR) 10 MG TABLET    Take 1 tablet (10 mg total) by mouth once daily.    VALACYCLOVIR (VALTREX) 1000 MG TABLET    Take 1 tablet (1,000 mg total) by mouth 2 (two) times daily.   Discontinued Medications    CITALOPRAM (CELEXA) 20 MG TABLET    Take 1 tablet (20 mg total) by mouth once daily.       Chief Complaint: Follow-up, 6 month  She is here today to f/u on chronic medical issues.      SHe has a history of depression and PTSD which was previously treated by celexa.  She has been able to wean herself off of the medication. She feels she is doing well. She is sad but denies depression. She is getting out and staying active. She is sleeping well and has a normal  appetite.  She denies suicidal ideations. SHe does have small panic attacks about once a month but they are less severe. She is able to breath through the events. She has xanax at home but she has not used.      She has a history of hypertension due to PCKD. She has been on treatment for HTN for some time and is controlled on current regimen of labetalol, telmisartan, and HCTZ.  She started taking her labetolol daily.  Her PCKD was dx at age 32. She has had u/s in the past and is followed by Dr. Alvarez but has not been seen in over three years. She has not had any recent labs. She denies hematuria. Her MGM has PCKD.       She has mild hyperlipidemia with last lipids were 151/151/44/76 on crestor 10 mg qday.  She is tolerating well without any side effects.  She is not on aspirin.      She has insulin resistance with HbA1c of 6.1 on 3/2017 due to her PCKD. She continues on metformin once a day.  She does not check her glucose.         She has DWAIN due to heavy frequent periods. She reports that her heavy periods have improved and she has very small irregular periods. She is still taking iron daily and has not rechecked her iron levels. She would like to start BCP today. She denies any family history of blood clots, no smoking or history of breast caner.     She continues to struggle to lose weight. She does keep a food journal. She is exercising twice a week.        Pap---7/2016 neg  Mammogram ---10/2016 neg  Tdap----7/2016  Pneumovax---none  Influenza vaccine---10/2017    Review of Systems   Constitutional: Negative for appetite change, fatigue, fever and unexpected weight change.   HENT: Negative for congestion, ear pain, hearing loss, sore throat and trouble swallowing.    Eyes: Negative for pain and visual disturbance.   Respiratory: Negative for cough, chest tightness, shortness of breath and wheezing.    Cardiovascular: Negative for chest pain, palpitations and leg swelling.   Gastrointestinal: Negative for  "abdominal pain, blood in stool, constipation, diarrhea, nausea and vomiting.   Endocrine: Negative for polyuria.   Genitourinary: Negative for dysuria and hematuria.   Musculoskeletal: Positive for back pain. Negative for arthralgias and myalgias.   Skin: Negative for rash.   Neurological: Negative for dizziness, weakness, numbness and headaches.   Hematological: Does not bruise/bleed easily.   Psychiatric/Behavioral: Negative for dysphoric mood, sleep disturbance and suicidal ideas. The patient is nervous/anxious.        Objective:      Vitals:    03/29/18 0837   BP: 118/86   BP Location: Right arm   Patient Position: Sitting   BP Method: Large (Manual)   Pulse: 80   Resp: 18   Temp: 98.4 °F (36.9 °C)   TempSrc: Oral   SpO2: 98%   Weight: 102.5 kg (226 lb)   Height: 5' 4" (1.626 m)     Body mass index is 38.79 kg/m².  Physical Exam    General appearance: No acute distress, cooperative  Eyes: PERRL, EOMI, conjunctiva clear  Ears: normal external ear and pinna, tm clear without drainage, canals clear  Nose: Normal mucosa without drainage  Throat: no exudates or erythema, tonsils not enlarged  Mouth: no sores or lesions, moist mucous membranes, good dentition  Neck: FROM, soft, supple, no thyromegaly, no bruits  Lymph: no anterior or posterior cervical adenopathy  Heart::  Regular rate and rhythm, no murmur  Lung: Clear to ascultation bilaterally, no wheezing, no rales, no rhonchi, no distress  Abdomen: Soft, nontender, no distention, no hepatosplenomegaly, bowel sounds normal, no guarding, no rebound, no peritoneal signs  Skin: no rashes, no lesions  Extremities: no edema, no cyanosis  Neuro: CN 2-12 intact, 5/5 muscle strength upper and lower extremity bilaterally, 2+ DTRs UE and LE bilaterally, normal gait, normal sensation  Peripheral pulses: 2+ pedal pulses bilaterally, good perfusion and color  Musculoskeletal: FROM, good strenth, no tenderness  Joint: normal appearance, no swelling, no warmth, no deformity in " all joints    Assessment:       1. Polycystic kidney disease    2. Insulin resistance    3. Essential hypertension    4. Hyperlipidemia, unspecified hyperlipidemia type    5. Iron deficiency anemia, unspecified iron deficiency anemia type    6. Major depressive disorder, single episode, mild    7. Panic attack    8. Oral contraception initial prescription    9. Class 2 severe obesity due to excess calories with serious comorbidity and body mass index (BMI) of 38.0 to 38.9 in adult    10. Encounter for screening mammogram for malignant neoplasm of breast    11. Screening for cervical cancer    12. Need for prophylactic vaccination against Streptococcus pneumoniae (pneumococcus)        Plan:       Polycystic kidney disease  Good control and due for renal u/s and urine studies. Will get labs and u/s then refer to nephrology.   -     US Retroperitoneal Complete (Kidney and; Future; Expected date: 03/29/2018  -     Ambulatory referral to Nephrology  -     Urinalysis Microscopic  -     Protein / creatinine ratio, urine; Future; Expected date: 03/29/2018    Insulin resistance  Doing well on metformin once a day. Will check HbA1c.     Essential hypertension  Good control on this regimen.   -     TSH; Future; Expected date: 03/29/2018    Hyperlipidemia, unspecified hyperlipidemia type  Good control on crestor. Will start aspirin daily.   -     Lipid panel; Future; Expected date: 03/29/2018  -     aspirin (ECOTRIN) 81 MG EC tablet; Take 1 tablet (81 mg total) by mouth once daily.; Refill: 0    Iron deficiency anemia, unspecified iron deficiency anemia type  She needs iron studies. Her periods have lightened and will check iron studies to see if stopping iron is appropriate.     Major depressive disorder, single episode, mild  Controlled off medication.     Panic attack  Still with small events about once a month but has been able to control with breathing. She is not using her xanax.     Oral contraception initial  prescription  -     norgestimate-ethinyl estradiol (ORTHO TRI-CYCLEN LO) 0.18/0.215/0.25 mg-25 mcg tablet; Take 1 tablet by mouth once daily.  Dispense: 30 tablet; Refill: 11    Class 2 severe obesity due to excess calories with serious comorbidity and body mass index (BMI) of 38.0 to 38.9 in adult  Discussed need for regular exercise and healthy eating to help lose weight.     Encounter for screening mammogram for malignant neoplasm of breast  -     Mammo Digital Screening Bilat with CAD; Future; Expected date: 03/29/2018    Screening for cervical cancer  -     Ambulatory referral to Obstetrics / Gynecology    Need for prophylactic vaccination against Streptococcus pneumoniae (pneumococcus)  -     Pneumococcal Polysaccharide Vaccine (23 Valent) (SQ/IM)    Follow-up in about 6 months (around 9/29/2018) for chronic medical issues.

## 2018-04-06 ENCOUNTER — CLINICAL SUPPORT (OUTPATIENT)
Dept: FAMILY MEDICINE | Facility: CLINIC | Age: 43
End: 2018-04-06
Payer: COMMERCIAL

## 2018-04-06 DIAGNOSIS — Q61.3 POLYCYSTIC KIDNEY DISEASE: ICD-10-CM

## 2018-04-06 DIAGNOSIS — I10 ESSENTIAL HYPERTENSION: ICD-10-CM

## 2018-04-06 DIAGNOSIS — R73.09 ELEVATED GLUCOSE: ICD-10-CM

## 2018-04-06 DIAGNOSIS — E78.5 HYPERLIPIDEMIA, UNSPECIFIED HYPERLIPIDEMIA TYPE: ICD-10-CM

## 2018-04-06 DIAGNOSIS — D50.9 IRON DEFICIENCY ANEMIA, UNSPECIFIED IRON DEFICIENCY ANEMIA TYPE: ICD-10-CM

## 2018-04-06 LAB
ALBUMIN SERPL BCP-MCNC: 3.5 G/DL
ALP SERPL-CCNC: 62 U/L
ALT SERPL W/O P-5'-P-CCNC: 14 U/L
ANION GAP SERPL CALC-SCNC: 11 MMOL/L
AST SERPL-CCNC: 15 U/L
BASOPHILS # BLD AUTO: 0.1 K/UL
BASOPHILS NFR BLD: 0.5 %
BILIRUB SERPL-MCNC: 0.5 MG/DL
BUN SERPL-MCNC: 18 MG/DL
CALCIUM SERPL-MCNC: 9.8 MG/DL
CHLORIDE SERPL-SCNC: 101 MMOL/L
CHOLEST SERPL-MCNC: 195 MG/DL
CHOLEST/HDLC SERPL: 4.1 {RATIO}
CO2 SERPL-SCNC: 25 MMOL/L
CREAT SERPL-MCNC: 1.1 MG/DL
CREAT UR-MCNC: 100.1 MG/DL
DIFFERENTIAL METHOD: NORMAL
EOSINOPHIL # BLD AUTO: 0.1 K/UL
EOSINOPHIL NFR BLD: 1 %
ERYTHROCYTE [DISTWIDTH] IN BLOOD BY AUTOMATED COUNT: 14.3 %
EST. GFR  (AFRICAN AMERICAN): >60 ML/MIN/1.73 M^2
EST. GFR  (NON AFRICAN AMERICAN): >60 ML/MIN/1.73 M^2
ESTIMATED AVG GLUCOSE: 114 MG/DL
FERRITIN SERPL-MCNC: 38 NG/ML
GLUCOSE SERPL-MCNC: 131 MG/DL
HBA1C MFR BLD HPLC: 5.6 %
HCT VFR BLD AUTO: 37.4 %
HDLC SERPL-MCNC: 47 MG/DL
HDLC SERPL: 24.1 %
HGB BLD-MCNC: 12.6 G/DL
IRON SERPL-MCNC: 57 UG/DL
LDLC SERPL CALC-MCNC: 95.6 MG/DL
LYMPHOCYTES # BLD AUTO: 2.6 K/UL
LYMPHOCYTES NFR BLD: 25.1 %
MCH RBC QN AUTO: 29 PG
MCHC RBC AUTO-ENTMCNC: 33.7 G/DL
MCV RBC AUTO: 86 FL
MONOCYTES # BLD AUTO: 0.6 K/UL
MONOCYTES NFR BLD: 5.4 %
NEUTROPHILS # BLD AUTO: 7.2 K/UL
NEUTROPHILS NFR BLD: 68 %
NONHDLC SERPL-MCNC: 148 MG/DL
PLATELET # BLD AUTO: 273 K/UL
PMV BLD AUTO: 9.9 FL
POTASSIUM SERPL-SCNC: 3.3 MMOL/L
PROT SERPL-MCNC: 7.6 G/DL
PROT UR-MCNC: 13 MG/DL
PROT/CREAT RATIO, UR: 0.13
RBC # BLD AUTO: 4.34 M/UL
SATURATED IRON: 14 %
SODIUM SERPL-SCNC: 137 MMOL/L
TOTAL IRON BINDING CAPACITY: 413 UG/DL
TRANSFERRIN SERPL-MCNC: 279 MG/DL
TRIGL SERPL-MCNC: 262 MG/DL
TSH SERPL DL<=0.005 MIU/L-ACNC: 2.31 UIU/ML
WBC # BLD AUTO: 10.5 K/UL

## 2018-04-06 PROCEDURE — 84443 ASSAY THYROID STIM HORMONE: CPT

## 2018-04-06 PROCEDURE — 85025 COMPLETE CBC W/AUTO DIFF WBC: CPT

## 2018-04-06 PROCEDURE — 80061 LIPID PANEL: CPT

## 2018-04-06 PROCEDURE — 83540 ASSAY OF IRON: CPT

## 2018-04-06 PROCEDURE — 84156 ASSAY OF PROTEIN URINE: CPT

## 2018-04-06 PROCEDURE — 83036 HEMOGLOBIN GLYCOSYLATED A1C: CPT

## 2018-04-06 PROCEDURE — 80053 COMPREHEN METABOLIC PANEL: CPT

## 2018-04-06 PROCEDURE — 82728 ASSAY OF FERRITIN: CPT

## 2018-04-08 ENCOUNTER — PATIENT MESSAGE (OUTPATIENT)
Dept: FAMILY MEDICINE | Facility: CLINIC | Age: 43
End: 2018-04-08

## 2018-04-26 ENCOUNTER — PATIENT MESSAGE (OUTPATIENT)
Dept: FAMILY MEDICINE | Facility: CLINIC | Age: 43
End: 2018-04-26

## 2018-04-26 RX ORDER — LABETALOL 100 MG/1
100 TABLET, FILM COATED ORAL 2 TIMES DAILY
Qty: 180 TABLET | Refills: 2 | Status: SHIPPED | OUTPATIENT
Start: 2018-04-26 | End: 2018-06-11 | Stop reason: SDUPTHER

## 2018-04-26 RX ORDER — LABETALOL 100 MG/1
TABLET, FILM COATED ORAL
Qty: 180 TABLET | Refills: 2 | Status: SHIPPED | OUTPATIENT
Start: 2018-04-26 | End: 2018-04-26 | Stop reason: SDUPTHER

## 2018-06-04 ENCOUNTER — OFFICE VISIT (OUTPATIENT)
Dept: FAMILY MEDICINE | Facility: CLINIC | Age: 43
End: 2018-06-04
Payer: COMMERCIAL

## 2018-06-04 VITALS
SYSTOLIC BLOOD PRESSURE: 118 MMHG | BODY MASS INDEX: 38.38 KG/M2 | WEIGHT: 224.81 LBS | HEART RATE: 66 BPM | HEIGHT: 64 IN | TEMPERATURE: 98 F | DIASTOLIC BLOOD PRESSURE: 72 MMHG | OXYGEN SATURATION: 98 %

## 2018-06-04 DIAGNOSIS — S46.811A STRAIN OF RIGHT TRAPEZIUS MUSCLE, INITIAL ENCOUNTER: Primary | ICD-10-CM

## 2018-06-04 DIAGNOSIS — M62.838 MUSCLE SPASMS OF NECK: ICD-10-CM

## 2018-06-04 PROCEDURE — 99214 OFFICE O/P EST MOD 30 MIN: CPT | Mod: S$GLB,,, | Performed by: NURSE PRACTITIONER

## 2018-06-04 PROCEDURE — 3074F SYST BP LT 130 MM HG: CPT | Mod: CPTII,S$GLB,, | Performed by: NURSE PRACTITIONER

## 2018-06-04 PROCEDURE — 3078F DIAST BP <80 MM HG: CPT | Mod: CPTII,S$GLB,, | Performed by: NURSE PRACTITIONER

## 2018-06-04 PROCEDURE — 3008F BODY MASS INDEX DOCD: CPT | Mod: CPTII,S$GLB,, | Performed by: NURSE PRACTITIONER

## 2018-06-04 RX ORDER — TIZANIDINE 2 MG/1
2 TABLET ORAL EVERY 6 HOURS PRN
COMMUNITY
Start: 2018-05-29 | End: 2022-01-28

## 2018-06-04 NOTE — PROGRESS NOTES
"Subjective:       Patient ID: Anyi Moore is a 43 y.o. female.    Chief Complaint: Neck Pain    HPI onset one week. Moved some furniture. Scapula pain on the right that radiates upward. Took some motrin/ norco and zanaflex.  Medications made her sleep. Arm feels weak. Can feel the muscle spasms. She is right handed. Some  weakness. She feels this is just a strain that is slow to heal. She denies any numbness or tingling to her arm or hand. See ROS.    The following portion of the patients history was reviewed and updated as appropriate: allergies, current medications, past medical and surgical history. Past social history and problem list reviewed. Family PMH and Past social history reviewed. Tobacco, Illicit drug use reviewed.     Review of Systems  Constitutional: No fatigue or fever    Skin: no rashes or lesions  Respiratory:   No SOB, Wheezing, cough  Cardiovascular:   No CP, Palpitations  Musculoskeletal:   No joint pain  No change in gait or coordination. See HPI.  Neurological:   No dizziness. No headaches    Objective:     /72   Pulse 66   Temp 98.4 °F (36.9 °C) (Oral)   Ht 5' 4" (1.626 m)   Wt 102 kg (224 lb 12.8 oz)   LMP 05/21/2018   SpO2 98%   BMI 38.59 kg/m²      Physical Exam    Constitutional: oriented to person, place, and time. well-developed and well-nourished.   Neck: Normal range of motion. Neck supple. No tracheal deviation present. No thyromegaly present.   Cardiovascular: Normal rate, regular rhythm and normal heart sounds.    Pulmonary/Chest: Effort normal and breath sounds normal. No respiratory distress. No wheezes.   Musculoskeletal: Normal range of motion.  Muscle spasms and tenderness over the right trapezius muscle. Normal ROM to right upper extremity.  strong, equal.    Neurological: oriented to person, place, and time.   Skin: Skin is warm and dry. No rashes or lesions    Assessment:       1. Strain of right trapezius muscle, initial encounter    2. " Muscle spasms of neck        Plan:         Anyi was seen today for neck pain.    Diagnoses and all orders for this visit:    Strain of right trapezius muscle, initial encounter: she would benefit from physical therapy and possible dry needling. Will place referral. She will call to set up appointment. She can continue the medications she has at home. Light stretching. Avoid heavy lifting.   -     Ambulatory consult to Physical Therapy    Continue current medication  Take medications only as prescribed  Healthy diet, exercise  Adequate rest  Adequate hydration  Avoid allergens  Avoid excessive caffeine

## 2018-06-11 ENCOUNTER — CLINICAL SUPPORT (OUTPATIENT)
Dept: REHABILITATION | Facility: HOSPITAL | Age: 43
End: 2018-06-11
Attending: NURSE PRACTITIONER
Payer: COMMERCIAL

## 2018-06-11 DIAGNOSIS — M54.2 NECK PAIN: ICD-10-CM

## 2018-06-11 DIAGNOSIS — E78.5 HYPERLIPIDEMIA, UNSPECIFIED HYPERLIPIDEMIA TYPE: ICD-10-CM

## 2018-06-11 DIAGNOSIS — B00.2 HERPES VIRUS INFECTION OF ORAL MUCOSA: ICD-10-CM

## 2018-06-11 PROCEDURE — 97161 PT EVAL LOW COMPLEX 20 MIN: CPT | Mod: PN

## 2018-06-11 RX ORDER — LABETALOL 100 MG/1
100 TABLET, FILM COATED ORAL 2 TIMES DAILY
Qty: 180 TABLET | Refills: 2 | Status: SHIPPED | OUTPATIENT
Start: 2018-06-11 | End: 2020-03-30 | Stop reason: SDUPTHER

## 2018-06-11 RX ORDER — VALACYCLOVIR HYDROCHLORIDE 1 G/1
1000 TABLET, FILM COATED ORAL 2 TIMES DAILY
Qty: 14 TABLET | Refills: 3 | Status: SHIPPED | OUTPATIENT
Start: 2018-06-11 | End: 2020-03-30 | Stop reason: SDUPTHER

## 2018-06-11 RX ORDER — ROSUVASTATIN CALCIUM 10 MG/1
10 TABLET, COATED ORAL DAILY
Qty: 90 TABLET | Refills: 3 | Status: SHIPPED | OUTPATIENT
Start: 2018-06-11 | End: 2019-08-07 | Stop reason: SDUPTHER

## 2018-06-11 NOTE — PLAN OF CARE
"TIME RECORD    06/11/2018    Start Time:  1720   Stop Time:  1810    PROCEDURES:    TIMED  Procedure Time Min.    Start:  Stop:     Start:  Stop:     Start:  Stop:     Start:  Stop:          UNTIMED  Procedure Time Min.   Evaluation Start:  Stop:     Start:  Stop:      Total Timed Minutes:  0  Total Timed Units:  0  Total Untimed Units:  1  Charges Billed/# of units:  1    OUTPATIENT PHYSICAL THERAPY   PATIENT EVALUATION  Onset Date: 05/27/18  Problem List Items Addressed This Visit     Neck pain          Medical Diagnosis: S46.811A (ICD-10-CM) - Strain of right trapezius muscle, initial encounter  Treatment Diagnosis: Impaired mobility with neck pain    Past Medical History:   Diagnosis Date    Depression     Hyperlipidemia     Hypertension     Insulin resistance     Lymphadenopathy 7/29/2016    Polycystic kidney     PTSD (post-traumatic stress disorder)        Past Surgical History:   Procedure Laterality Date    DILATION AND CURETTAGE OF UTERUS      x4       has a current medication list which includes the following prescription(s): alprazolam, aspirin, diphenhydramine, ferrous sulfate, fish oil-omega-3 fatty acids, hydrochlorothiazide, labetalol, losartan, metformin, multivitamin, norgestimate-ethinyl estradiol, rosuvastatin, tizanidine, and valacyclovir.    Precautions: HTN  Surgical history: see PMhx  Prior Therapy: yes  History of Present Illness: Pt is a 44 yo R handed F reporting acute onset of neck/scapular pain on 05/27/18 after moving furniture.  Reports muscle spasms and right arm weakness.   Prior Level of Function: Independent  Social History: [employer] employee    Current level of function: Independent  Functional Deficits Leading to Referral/Nature of Injury: weakness, impaired endurance, impaired functional mobility, decreased upper extremity function, pain and decreased ROM  Patient Therapy Goals: "To be pain-free."      Subjective     Anyi Moore states problems " lifting--even a yeti cup is a strain to lift. 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   .    Pain:  Location: R upper trap, R posterior/lateral neck, R scapular region  Description: heavy, aching, sharp, pulling  Activities Which Increase Pain: allowing arm to hang down, lifting  Activities Which Decrease Pain: exercises followed by cold pack, lidocaine patch, supine positioning, aleve, tylenol  Pain Scale: 3/10 at best 3/10 now, 7-8/10 at worst    Objective     Posture/Appearance: Fwd head position, rounded shoulders, R shoulder slightly elevated  Palpation: TTP R UT, rhomboids, R cervical paraspinals  Sensation: WFL  Range of Motion/Strength:    Cervical AROM:   Flexion 30*   Extension 60*   Right side bending 24*   Left side bending 26*   Right rotation 68*   Left rotation 56*       Upper Extremity Range of Motion:   Right Upper Extremity: WFL   Left Upper Extremity: WFL    Upper Extremity Strength:   Right Upper Extremity: 4/5 to 4+/5, handgrip 30#   Left Upper Extremity: 4+/5, handgrip 45#    Flexibility: ROM as per above  Gait: WFL  Bed Mobility: Supine<>sit independent  Transfers: Sit<>stand independent  Special Tests:   Cervical compression (-)  Cervical distraction (-)  Functional Outcome Measure: Neck Disability Index (NDI): 12/50=24% impairment  Treatment: Educated on role/goal PT, POC.  Pt verbalizing understanding and agreement.     Assessment       Initial Assessment (Pertinent finding, problem list and factors affecting outcome): Patient presents to PT with c/o R sided cervical/scapular pain after moving furniture 05/27/18. Notable impairments include weakness, impaired endurance, impaired functional mobility, decreased upper extremity function, pain and decreased ROM, and impaired functional mobility. Patient would benefit from skilled PT to address notable impairments and improve functional mobility to OF.      Rehab Potiential: good      Short Term Goals (3 Weeks): 1) Pt will initiate  HEP 2) Patient will improve R UE strength by 1/2 muscle grade 3) Pt will improve cervical rotation ROM by 5* or >   Long Term Goals (6 Weeks): 1) Pt will be I with HEP 2) Pt will return to I/ADL's with strength 4+/5 or > and handgrip strength increased by 5# 3) Pt will improve NDI to <15% impairment 4) Pt will demonstrate symmetric, pain-free cervical rotation    Plan     Certification Period: 06/11/2018 to 08/03/18  Recommended Treatment Plan: 2 times per week for 6 weeks: Cervical/Lumbar Traction, Electrical Stimulation PRN, Manual Therapy, Moist Heat/ Ice, Neuromuscular Re-ed, Patient Education, Therapeutic Activites, Therapeutic Exercise, Ultrasound and dry needling PRN      Thank you for referral.    Therapist: Nhi Becerril, PT    I CERTIFY THE NEED FOR THESE SERVICES FURNISHED UNDER THIS PLAN OF TREATMENT AND WHILE UNDER MY CARE    Physician's comments: ________________________________________________________________________________________________________________________________________________      Physician's Name: ___________________________________

## 2018-06-18 ENCOUNTER — CLINICAL SUPPORT (OUTPATIENT)
Dept: REHABILITATION | Facility: HOSPITAL | Age: 43
End: 2018-06-18
Attending: NURSE PRACTITIONER
Payer: COMMERCIAL

## 2018-06-18 DIAGNOSIS — M54.2 NECK PAIN: ICD-10-CM

## 2018-06-18 PROCEDURE — 97010 HOT OR COLD PACKS THERAPY: CPT | Mod: PN

## 2018-06-18 PROCEDURE — 97110 THERAPEUTIC EXERCISES: CPT | Mod: PN

## 2018-06-18 NOTE — PROGRESS NOTES
Name: Anyi Moore  Date:   06/18/2018  Primary Diagnosis: .  Problem List Items Addressed This Visit     Neck pain          Time in: 0710  Time Out: 0756  Total Treatment Time: 46  Group Time: 0      Subjective:    Patient reports she has had a good week until she went to a water park yesterday, where she feels like she overdid it a little.  Patient reports their pain to be 2/10 on a 0-10 scale with 0 being no pain and 10 being the worst pain imaginable.    Objective    Patient received individual therapy to address strength, endurance, ROM and flexibility with 0 other patients with activities as follows:     Patient received therapeutic exercises to develop strength, endurance, ROM and flexibility for 36 minutes including:     UBE 5'  Shoulder rolls retro x 20  Scapular retractions x 20  Shoulder shrugs x 20  Chin tucks x 20  UT stretch 3/30 sec L/R  LS stretch 3/30 sec L/R    MT: STM/MF to cervical paraspinals, UT's x 7 min    CP x 10 min for muscle soreness purposes    Assessment:     Pt tolerating treatment with mild soreness. Frequent vc's given to learn exercises.     Pt will continue to benefit from skilled PT intervention. Medical Necessity is demonstrated by:  Pain limits function of effected part for some activities, Unable to participate fully in daily activities, Requires skilled supervision to complete and progress HEP and Weakness.       Patient is making  progress towards established goals.    Plan:  Continue with established Plan of Care towards PT goals.

## 2018-06-19 ENCOUNTER — TELEPHONE (OUTPATIENT)
Dept: FAMILY MEDICINE | Facility: CLINIC | Age: 43
End: 2018-06-19

## 2018-06-19 RX ORDER — LOSARTAN POTASSIUM 50 MG/1
50 TABLET ORAL DAILY
Qty: 90 TABLET | Refills: 3 | Status: SHIPPED | OUTPATIENT
Start: 2018-06-19 | End: 2018-12-21

## 2018-06-19 RX ORDER — LOSARTAN POTASSIUM 50 MG/1
TABLET ORAL
Qty: 90 TABLET | Refills: 3 | Status: SHIPPED | OUTPATIENT
Start: 2018-06-19 | End: 2018-12-21

## 2018-06-20 ENCOUNTER — CLINICAL SUPPORT (OUTPATIENT)
Dept: REHABILITATION | Facility: HOSPITAL | Age: 43
End: 2018-06-20
Attending: NURSE PRACTITIONER
Payer: COMMERCIAL

## 2018-06-20 DIAGNOSIS — M54.2 NECK PAIN: ICD-10-CM

## 2018-06-20 PROCEDURE — 97110 THERAPEUTIC EXERCISES: CPT | Mod: PN

## 2018-06-20 NOTE — PROGRESS NOTES
"Name: Anyi Moore  [facility] Number: [id number]  Date of Treatment: 06/20/2018   Diagnosis:   Encounter Diagnosis   Name Primary?    Neck pain        Time in: 1705  Time Out: 1750  Total Treatment Time: 45    Subjective:    Anyi reports improvement of symptoms.  Patient reports no pain, "just soreness, like a mm spasm" R l/s insertion area. Has sunburn on her shoulders from going to the beach. No problems with c/s rotation with driving any longer.      Objective:    Patient received individual therapy to increase strength, endurance, ROM, flexibility, posture and core stabilization with activities as follows:     Anyi received therapeutic exercises to develop strength, endurance, ROM, flexibility, posture and core stabilization for 40 minutes including:     UBE 5/3-stopped retro 2* discomfort  Seated ex's 10/3:     C/s AROM     Chin tucks     Retro rolls B     Scap retraction    Standing rows 15# 10/3 B  Standing B shoulder extn 10/3 15#    Seated levator scapulae stretches 3/30s L/R  Seated UT stretches 3/30s L/R    STM over R shoulder and neck x 5'-limited 2* sunburn.     Continue HEP daily.    Pt demo good understanding of the education provided. Patient demonstrated good return demonstration of activities.     Assessment:     Notable improvement in c/s rotation with improving symmetry and nearly full ranges. Progressing with increased challenges with good response.     Pt will continue to benefit from skilled PT intervention. Medical Necessity is demonstrated by:  Requires skilled supervision to complete and progress HEP and Weakness.    Patient is making good progress towards established goals.    Plan:    Continue with established Plan of Care towards PT goals.     I certify that I was present in the room directing the student in service delivery and guiding them using my skilled judgment. As the co-signing therapist I have reviewed the students documentation and am responsible for the treatment, " assessment, and plan.    Melina Goncalves, PTA

## 2018-06-28 ENCOUNTER — CLINICAL SUPPORT (OUTPATIENT)
Dept: REHABILITATION | Facility: HOSPITAL | Age: 43
End: 2018-06-28
Attending: NURSE PRACTITIONER
Payer: COMMERCIAL

## 2018-06-28 DIAGNOSIS — M54.2 NECK PAIN: ICD-10-CM

## 2018-06-28 PROCEDURE — 97140 MANUAL THERAPY 1/> REGIONS: CPT | Mod: PN

## 2018-06-28 PROCEDURE — 97110 THERAPEUTIC EXERCISES: CPT | Mod: PN

## 2018-06-28 NOTE — PROGRESS NOTES
Name: Anyi Moore  Date:   06/28/2018  Primary Diagnosis: .  Problem List Items Addressed This Visit     Neck pain          Time in: 0706  Time Out: 0800  Total Treatment Time: 54  Group Time: 44      Subjective:    Patient reports worsened pain and now getting more achy, numb feeling down her RUE to her hand. Not sure if she's been more active or not.  Patient reports their pain to be 6/10 on a 0-10 scale with 0 being no pain and 10 being the worst pain imaginable.    Objective    Patient received grouped therapy with 1 other patient to address strength, endurance, ROM and flexibility with 0 other patients with activities as follows:     Patient received therapeutic exercises to develop strength, endurance, ROM and flexibility for 44 minutes including:     UBE 5'/5'  C/S AROM flex/ext/SB/rotation x 30   Chin tucks x 30  Shoulder shrugs x 30  Shoulder rolls x 30  Scapular retractions x 30  UT stretch 3/30 sec L/R  LS stretch 3/30 sec L/R    MT: STM to cervical paraspinals and UT's x 10 min in seated    Assessment:       Pt will continue to benefit from skilled PT intervention. Medical Necessity is demonstrated by:  Pain limits function of effected part for some activities, Unable to participate fully in daily activities, Requires skilled supervision to complete and progress HEP and Weakness.    Patient is making good progress towards established goals.    Plan:  Continue with established Plan of Care towards PT goals.

## 2018-06-29 ENCOUNTER — PATIENT MESSAGE (OUTPATIENT)
Dept: FAMILY MEDICINE | Facility: CLINIC | Age: 43
End: 2018-06-29

## 2018-06-29 DIAGNOSIS — S46.811A STRAIN OF RIGHT TRAPEZIUS MUSCLE, INITIAL ENCOUNTER: Primary | ICD-10-CM

## 2018-07-02 ENCOUNTER — CLINICAL SUPPORT (OUTPATIENT)
Dept: REHABILITATION | Facility: HOSPITAL | Age: 43
End: 2018-07-02
Attending: NURSE PRACTITIONER
Payer: COMMERCIAL

## 2018-07-02 DIAGNOSIS — M54.2 NECK PAIN: ICD-10-CM

## 2018-07-02 PROCEDURE — 97110 THERAPEUTIC EXERCISES: CPT | Mod: PN

## 2018-07-02 PROCEDURE — 97140 MANUAL THERAPY 1/> REGIONS: CPT | Mod: PN

## 2018-07-09 ENCOUNTER — CLINICAL SUPPORT (OUTPATIENT)
Dept: REHABILITATION | Facility: HOSPITAL | Age: 43
End: 2018-07-09
Attending: NURSE PRACTITIONER
Payer: COMMERCIAL

## 2018-07-09 DIAGNOSIS — M54.2 NECK PAIN: ICD-10-CM

## 2018-07-09 PROCEDURE — 97140 MANUAL THERAPY 1/> REGIONS: CPT | Mod: PN

## 2018-07-09 PROCEDURE — 97110 THERAPEUTIC EXERCISES: CPT | Mod: PN

## 2018-07-09 NOTE — PROGRESS NOTES
"Name: Anyi Moore  [facility] Number: [id number]  Date of Treatment: 07/09/2018   Diagnosis:   Encounter Diagnosis   Name Primary?    Neck pain        Time in: 1503  Time Out: 1558  Total Treatment Time: 55min  Group Time:N/A      Subjective:    Anyi reports decreased pain.  Patient reports their pain to be 1/10 on a 0-10 scale with 0 being no pain and 10 being the worst pain imaginable.  Anyi arrived at therapy on her own.  She reports using her left arm for activities sometimes that she's normally do right-handed to avoid possibly making her right arm or shoulder hurt again.  Pt reports occasional moments of "feel it tingling," after sitting at desk at work. Tingling occurs down to fingertips. Reports overall decreased intensity and frequency of tingling/numbness than previously.     Objective:    Patient received individual therapy to increase strength, endurance and ROM with activities as follows:     Anyi received therapeutic exercises to develop strength, endurance and ROM for 45 minutes including:     Seated:  UBE 5'/5' L1  OHP 5' Flexion  C/S AROM Flex,Ext, Rot,SB x 30ea  Retro Shoulder Rolls B x 30  Scap Squeezes  B x 30  Chin Tucks x 30  UT Stretch R/L 3/30"ea  LS Stretch R/L 3/30"ea    Anyi received the following manual therapy techniques: Soft tissue Mobilization were applied to the: R UT and LS for 10 minutes.      Anyi demonstrated good return demonstration of activities.     Assessment:     Anyi presented with soft tissue restrictions in the R upper trap and levator scapula, which was improved with MT.  Decreased pain per pt report.     Pt will continue to benefit from skilled PT intervention. Medical Necessity is demonstrated by:  Unable to participate fully in daily activities, Requires skilled supervision to complete and progress HEP and Weakness.    Patient is making good progress towards established goals.      Plan:  Continue with established Plan of Care towards PT goals. "     Jennifer Linder, AGUSTO    I certify that I was present in the room directing the student in service delivery and guiding them using my skilled judgment. As the co-signing therapist I have reviewed the students documentation and am responsible for the treatment, assessment, and plan.    eMlina Goncalves, PTA

## 2018-07-12 ENCOUNTER — CLINICAL SUPPORT (OUTPATIENT)
Dept: REHABILITATION | Facility: HOSPITAL | Age: 43
End: 2018-07-12
Attending: NURSE PRACTITIONER
Payer: COMMERCIAL

## 2018-07-12 DIAGNOSIS — M54.2 NECK PAIN: ICD-10-CM

## 2018-07-12 PROCEDURE — 97110 THERAPEUTIC EXERCISES: CPT | Mod: PN

## 2018-07-12 PROCEDURE — 97140 MANUAL THERAPY 1/> REGIONS: CPT | Mod: PN

## 2018-07-12 NOTE — PROGRESS NOTES
"Name: Anyi Moore  Date:   07/12/2018  Primary Diagnosis: .  Problem List Items Addressed This Visit     Neck pain          Time in: 1710  Time Out: 1800  Total Treatment Time: 50  Group Time: 0      Subjective:    Patient reports continued pain/discomfort in cervical region and "achiness," and tingling in RUE, worsened with activity.  Patient reports their pain to be 1/10 on a 0-10 scale with 0 being no pain and 10 being the worst pain imaginable.    Objective    Patient received individual therapy to address strength, endurance, ROM and flexibility with 0 other patients with activities as follows:     Patient received therapeutic exercises to develop strength, endurance, ROM, flexibility and posture for 33 minutes including:     UBE 5'/5'  Chin tucks x 30  Shoulder rolls retro x 30  OH pulleys x 5 min   UT stretch 3/30 sec L/R  LS stretch 3/30 sec L/R    MT: manual intermittent cervical distraction, f/b STM to cervical paraspinals and UT's (seated) x 17 min     Assessment:     Pt with 0/10 pain at end of session. R UE pain variable during session, slight improvement in achiness following MT.    Pt will continue to benefit from skilled PT intervention. Medical Necessity is demonstrated by:  Pain limits function of effected part for some activities, Unable to participate fully in daily activities, Requires skilled supervision to complete and progress HEP and Weakness.    Patient is making good progress towards established goals.    Plan:  Continue with established Plan of Care towards PT goals.     "

## 2018-08-09 DIAGNOSIS — E11.9 DIABETES TYPE 2, CONTROLLED: ICD-10-CM

## 2018-08-09 RX ORDER — METFORMIN HYDROCHLORIDE 500 MG/1
500 TABLET ORAL
Qty: 90 TABLET | Refills: 3 | Status: SHIPPED | OUTPATIENT
Start: 2018-08-09 | End: 2018-12-21 | Stop reason: SDUPTHER

## 2018-08-09 RX ORDER — METFORMIN HYDROCHLORIDE 500 MG/1
TABLET ORAL
Qty: 90 TABLET | Refills: 3 | Status: SHIPPED | OUTPATIENT
Start: 2018-08-09 | End: 2020-03-30 | Stop reason: SDUPTHER

## 2018-08-18 RX ORDER — HYDROCHLOROTHIAZIDE 25 MG/1
TABLET ORAL
Qty: 90 TABLET | Refills: 2 | Status: SHIPPED | OUTPATIENT
Start: 2018-08-18 | End: 2019-06-07 | Stop reason: SDUPTHER

## 2018-12-21 ENCOUNTER — OFFICE VISIT (OUTPATIENT)
Dept: FAMILY MEDICINE | Facility: CLINIC | Age: 43
End: 2018-12-21
Payer: COMMERCIAL

## 2018-12-21 VITALS
HEART RATE: 71 BPM | BODY MASS INDEX: 38.17 KG/M2 | TEMPERATURE: 99 F | DIASTOLIC BLOOD PRESSURE: 96 MMHG | OXYGEN SATURATION: 99 % | SYSTOLIC BLOOD PRESSURE: 142 MMHG | WEIGHT: 222.38 LBS

## 2018-12-21 DIAGNOSIS — F32.0 MAJOR DEPRESSIVE DISORDER, SINGLE EPISODE, MILD: ICD-10-CM

## 2018-12-21 DIAGNOSIS — R73.09 ELEVATED GLUCOSE: ICD-10-CM

## 2018-12-21 DIAGNOSIS — E78.5 HYPERLIPIDEMIA, UNSPECIFIED HYPERLIPIDEMIA TYPE: ICD-10-CM

## 2018-12-21 DIAGNOSIS — F41.0 PANIC ATTACK: ICD-10-CM

## 2018-12-21 DIAGNOSIS — I10 ESSENTIAL HYPERTENSION: ICD-10-CM

## 2018-12-21 DIAGNOSIS — R07.9 CHEST PAIN, UNSPECIFIED TYPE: Primary | ICD-10-CM

## 2018-12-21 DIAGNOSIS — E88.819 INSULIN RESISTANCE: ICD-10-CM

## 2018-12-21 DIAGNOSIS — Z23 NEED FOR VACCINATION AGAINST HEPATITIS A: ICD-10-CM

## 2018-12-21 DIAGNOSIS — Q61.3 POLYCYSTIC KIDNEY DISEASE: ICD-10-CM

## 2018-12-21 DIAGNOSIS — D50.9 IRON DEFICIENCY ANEMIA, UNSPECIFIED IRON DEFICIENCY ANEMIA TYPE: ICD-10-CM

## 2018-12-21 PROCEDURE — 3008F BODY MASS INDEX DOCD: CPT | Mod: CPTII,S$GLB,, | Performed by: INTERNAL MEDICINE

## 2018-12-21 PROCEDURE — 99214 OFFICE O/P EST MOD 30 MIN: CPT | Mod: 25,S$GLB,, | Performed by: INTERNAL MEDICINE

## 2018-12-21 PROCEDURE — 90632 HEPA VACCINE ADULT IM: CPT | Mod: 59,S$GLB,, | Performed by: INTERNAL MEDICINE

## 2018-12-21 PROCEDURE — 93010 ELECTROCARDIOGRAM REPORT: CPT | Mod: S$GLB,,, | Performed by: INTERNAL MEDICINE

## 2018-12-21 PROCEDURE — 3080F DIAST BP >= 90 MM HG: CPT | Mod: CPTII,S$GLB,, | Performed by: INTERNAL MEDICINE

## 2018-12-21 PROCEDURE — 93005 ELECTROCARDIOGRAM TRACING: CPT | Mod: S$GLB,,, | Performed by: INTERNAL MEDICINE

## 2018-12-21 PROCEDURE — 3077F SYST BP >= 140 MM HG: CPT | Mod: CPTII,S$GLB,, | Performed by: INTERNAL MEDICINE

## 2018-12-21 PROCEDURE — 90471 IMMUNIZATION ADMIN: CPT | Mod: S$GLB,,, | Performed by: INTERNAL MEDICINE

## 2018-12-21 RX ORDER — OLMESARTAN MEDOXOMIL 40 MG/1
40 TABLET ORAL DAILY
Qty: 90 TABLET | Refills: 3 | Status: SHIPPED | OUTPATIENT
Start: 2018-12-21 | End: 2019-04-18

## 2018-12-21 NOTE — PROGRESS NOTES
Subjective:       Patient ID: Anyi Moore is a 43 y.o. female.       Medication List           Accurate as of 12/21/18 11:31 AM. If you have any questions, ask your nurse or doctor.               START taking these medications    olmesartan 40 MG tablet  Commonly known as:  BENICAR  Take 1 tablet (40 mg total) by mouth once daily.        CONTINUE taking these medications    ALPRAZolam 0.5 MG tablet  Commonly known as:  XANAX  Take 1 tablet (0.5 mg total) by mouth daily as needed for Anxiety. Panic attacks     aspirin 81 MG EC tablet  Commonly known as:  ECOTRIN  Take 1 tablet (81 mg total) by mouth once daily.     diphenhydrAMINE 25 mg capsule  Commonly known as:  BENADRYL  Take 1 each (25 mg total) by mouth every 6 (six) hours as needed for Itching.     fish oil-omega-3 fatty acids 300-1,000 mg capsule     hydroCHLOROthiazide 25 MG tablet  Commonly known as:  HYDRODIURIL  TAKE ONE TABLET BY MOUTH ONCE DAILY     labetalol 100 MG tablet  Commonly known as:  NORMODYNE  Take 1 tablet (100 mg total) by mouth 2 (two) times daily.     metFORMIN 500 MG tablet  Commonly known as:  GLUCOPHAGE  TAKE ONE TABLET BY MOUTH ONCE DAILY WITH BREAKFAST     multivitamin per tablet  Commonly known as:  THERAGRAN     norgestimate-ethinyl estradiol 0.18/0.215/0.25 mg-25 mcg tablet  Commonly known as:  ORTHO TRI-CYCLEN LO  Take 1 tablet by mouth once daily.     rosuvastatin 10 MG tablet  Commonly known as:  CRESTOR  Take 1 tablet (10 mg total) by mouth once daily.     tiZANidine 2 MG tablet  Commonly known as:  ZANAFLEX     valACYclovir 1000 MG tablet  Commonly known as:  VALTREX  Take 1 tablet (1,000 mg total) by mouth 2 (two) times daily.        STOP taking these medications    ferrous sulfate 325 mg (65 mg iron) Tab tablet  Commonly known as:  FEOSOL     losartan 50 MG tablet  Commonly known as:  COZAAR           Where to Get Your Medications      These medications were sent to [facility] [place], [place] - [address]  [address], [place] [address]    Phone:  [phone]   · olmesartan 40 MG tablet         Chief Complaint: Medication Refill and Chest Pain (none at this time, comes and goes)  She is here today to f/u on chronic medical issues.      SHe has a history of depression and PTSD which was previously treated by celexa but has done well off medication. She is sad but denies depression. She is getting out and staying active. She is sleeping well and has a normal appetite.  She denies suicidal ideations. SHe does have small panic attacks about once a month but they are less severe. She is able to breath through the events. She has xanax (filled #20 on 3/31/2017) and only used about 2 times total.       She has a history of hypertension due to PCKD. She has been on treatment for HTN for some time and is taking losartan 50 mg qday, HCTZ 25 mg qday and labetolol 100 mg bid. She does not check her BP at home.   Her PCKD was dx at age 32. She has had u/s in the past and is followed by Dr. Alvarez but has not been seen in over three years. She denies hematuria. Her MGM has PCKD.      She does complain of intermittent left sided chest pain that last for minutes.  Nothing makes better or worse. No associated symptoms. It is not reproducible. She is able to exercise without pain.  She describes more as a focal tightening then pain.  No shortness of breath or increasing fatigue.      She has mild hyperlipidemia with last lipids were 195/262/47/95 on 4/2018 taking crestor 10 mg qday.  She is tolerating well without any side effects.  She is on aspirin daily.      She has insulin resistance with HbA1c of 5.6 on 4/2018 due to her PCKD. She continues on metformin 500 mg once a day.  She does not check her glucose.  Her microalbumin is negative on 4/2018.       She has DWAIN due to heavy frequent periods. This has greatly improved since starting BCP.      She is exercising regularly and eating healthy.         Pap---7/2016  neg  Mammogram ---10/2016 neg  Tdap----7/2016  Pneumovax---3/2018  Influenza vaccine---9/2018    Review of Systems   Constitutional: Negative for appetite change, fatigue, fever and unexpected weight change.   HENT: Negative for congestion, ear pain, hearing loss, sore throat and trouble swallowing.    Eyes: Negative for pain and visual disturbance.   Respiratory: Positive for chest tightness. Negative for cough, shortness of breath and wheezing.    Cardiovascular: Positive for leg swelling. Negative for chest pain and palpitations.   Gastrointestinal: Negative for abdominal pain, blood in stool, constipation, diarrhea, nausea and vomiting.   Endocrine: Negative for polyuria.   Genitourinary: Negative for dysuria and hematuria.   Musculoskeletal: Positive for neck pain. Negative for arthralgias, back pain and myalgias.   Skin: Negative for rash.   Neurological: Positive for headaches. Negative for dizziness, weakness and numbness.   Hematological: Does not bruise/bleed easily.   Psychiatric/Behavioral: Negative for dysphoric mood, sleep disturbance and suicidal ideas. The patient is nervous/anxious.        Objective:      Vitals:    12/21/18 0839 12/21/18 0856   BP: (!) 158/96 (!) 142/96   BP Location: Left arm    Patient Position: Sitting    Pulse: 71    Temp: 98.8 °F (37.1 °C)    TempSrc: Oral    SpO2: 99%    Weight: 100.9 kg (222 lb 6.4 oz)      Body mass index is 38.17 kg/m².  Physical Exam    General appearance: No acute distress, cooperative  Neck: FROM, soft, supple, no thyromegaly, no bruits  Lymph: no anterior or posterior cervical adenopathy  Heart::  Regular rate and rhythm, no murmur  Lung: Clear to ascultation bilaterally, no wheezing, no rales, no rhonchi, no distress  Abdomen: Soft, nontender, no distention, no hepatosplenomegaly, bowel sounds normal, no guarding, no rebound, no peritoneal signs  Skin: no rashes, no lesions  Extremities:trace b/l edema, no cyanosis  Neuro: no focal abnormalities,  strength 5/5 b/l UE and LE, 2+ DTRs b/l UE and LE, normal gait  Peripheral pulses: 2+ pedal pulses bilaterally, good perfusion and color  Musculoskeletal: FROM, good strenth, no tenderness  Joint: normal appearance, no swelling, no warmth, no deformity in all joints    Assessment:       1. Chest pain, unspecified type    2. Polycystic kidney disease    3. Essential hypertension    4. Hyperlipidemia, unspecified hyperlipidemia type    5. Insulin resistance    6. Elevated glucose    7. Iron deficiency anemia, unspecified iron deficiency anemia type    8. Major depressive disorder, single episode, mild    9. Panic attack    10. Need for vaccination against hepatitis A        Plan:       Chest pain, unspecified type  Chest pain that is noncardiac by history. EKG reassuring.  Advised of signs of worsening or concern to return to clinic.   -     IN OFFICE EKG 12-LEAD (to Muse)    Polycystic kidney disease  She is due for a repeat renal u/s (already ordered).      Essential hypertension  Uncontrolled. Will stop losartan due to drug recall and start olmesartan and increase dose. Continue labetolol and HCTZ.  She will recheck her BP with nurse in 2 weeks and if elevated then consider changing HCTZ to triamterene-HCTZ (because she does have some edema).    -     olmesartan (BENICAR) 40 MG tablet; Take 1 tablet (40 mg total) by mouth once daily.  Dispense: 90 tablet; Refill: 3  -     CBC auto differential; Future; Expected date: 03/01/2019  -     Comprehensive metabolic panel; Future; Expected date: 03/01/2019  -     Lipid panel; Future; Expected date: 03/01/2019  -     TSH; Future; Expected date: 03/01/2019    Hyperlipidemia, unspecified hyperlipidemia type  Good control on this regimen. Recheck lipids prior to her next appt.     Insulin resistance  Continue metformin. She is doing very well.  -     Hemoglobin A1c; Future; Expected date: 03/01/2019    Iron deficiency anemia, unspecified iron deficiency anemia type  Improved  since periods have normalized on BCP. Recheck iron studies with next labs.   -     Iron and TIBC; Future; Expected date: 03/01/2019  -     Ferritin; Future; Expected date: 03/01/2019    Major depressive disorder, single episode, mild  STable despite stopping medication.      Panic attack  Controlled and rarely uses xanax. She has not filled in over 18 months.     Need for vaccination against hepatitis A  -     Hepatitis A Vaccine (Adult) (IM)    Follow-up in about 6 months (around 6/21/2019) for chronic medical issues.

## 2019-01-07 ENCOUNTER — CLINICAL SUPPORT (OUTPATIENT)
Dept: FAMILY MEDICINE | Facility: CLINIC | Age: 44
End: 2019-01-07
Payer: COMMERCIAL

## 2019-01-07 VITALS — SYSTOLIC BLOOD PRESSURE: 112 MMHG | DIASTOLIC BLOOD PRESSURE: 70 MMHG | HEART RATE: 74 BPM

## 2019-01-07 NOTE — PROGRESS NOTES
In for blood pressure check.  Last blood pressure at visit was 142/96 with pulse of 71.  Last medication dose was at 720 am.  Blood pressure today is 112/70 with pulse of 76.

## 2019-01-10 ENCOUNTER — PATIENT MESSAGE (OUTPATIENT)
Dept: FAMILY MEDICINE | Facility: CLINIC | Age: 44
End: 2019-01-10

## 2019-04-02 ENCOUNTER — APPOINTMENT (OUTPATIENT)
Dept: LAB | Facility: HOSPITAL | Age: 44
End: 2019-04-02
Attending: INTERNAL MEDICINE
Payer: COMMERCIAL

## 2019-04-02 ENCOUNTER — CLINICAL SUPPORT (OUTPATIENT)
Dept: FAMILY MEDICINE | Facility: CLINIC | Age: 44
End: 2019-04-02
Payer: COMMERCIAL

## 2019-04-02 DIAGNOSIS — D50.9 IRON DEFICIENCY ANEMIA, UNSPECIFIED IRON DEFICIENCY ANEMIA TYPE: ICD-10-CM

## 2019-04-02 DIAGNOSIS — I10 ESSENTIAL HYPERTENSION: ICD-10-CM

## 2019-04-02 DIAGNOSIS — R73.09 ELEVATED GLUCOSE: ICD-10-CM

## 2019-04-02 LAB
ALBUMIN SERPL BCP-MCNC: 4.1 G/DL (ref 3.5–5.2)
ALP SERPL-CCNC: 56 U/L (ref 55–135)
ALT SERPL W/O P-5'-P-CCNC: 12 U/L (ref 10–44)
ANION GAP SERPL CALC-SCNC: 8 MMOL/L (ref 8–16)
AST SERPL-CCNC: 12 U/L (ref 10–40)
BASOPHILS # BLD AUTO: 0 K/UL (ref 0–0.2)
BASOPHILS NFR BLD: 0.5 % (ref 0–1.9)
BILIRUB SERPL-MCNC: 0.6 MG/DL (ref 0.1–1)
BUN SERPL-MCNC: 22 MG/DL (ref 6–20)
CALCIUM SERPL-MCNC: 10.7 MG/DL (ref 8.7–10.5)
CHLORIDE SERPL-SCNC: 103 MMOL/L (ref 95–110)
CHOLEST SERPL-MCNC: 173 MG/DL (ref 120–199)
CHOLEST/HDLC SERPL: 4 {RATIO} (ref 2–5)
CO2 SERPL-SCNC: 28 MMOL/L (ref 23–29)
CREAT SERPL-MCNC: 1.5 MG/DL (ref 0.5–1.4)
DIFFERENTIAL METHOD: NORMAL
EOSINOPHIL # BLD AUTO: 0.1 K/UL (ref 0–0.5)
EOSINOPHIL NFR BLD: 1.5 % (ref 0–8)
ERYTHROCYTE [DISTWIDTH] IN BLOOD BY AUTOMATED COUNT: 13.3 % (ref 11.5–14.5)
EST. GFR  (AFRICAN AMERICAN): 48 ML/MIN/1.73 M^2
EST. GFR  (NON AFRICAN AMERICAN): 42 ML/MIN/1.73 M^2
ESTIMATED AVG GLUCOSE: 111 MG/DL (ref 68–131)
FERRITIN SERPL-MCNC: 80 NG/ML (ref 20–300)
GLUCOSE SERPL-MCNC: 120 MG/DL (ref 70–110)
HBA1C MFR BLD HPLC: 5.5 % (ref 4–5.6)
HCT VFR BLD AUTO: 37.1 % (ref 37–48.5)
HDLC SERPL-MCNC: 43 MG/DL (ref 40–75)
HDLC SERPL: 24.9 % (ref 20–50)
HGB BLD-MCNC: 12.3 G/DL (ref 12–16)
IRON SERPL-MCNC: 71 UG/DL (ref 30–160)
LDLC SERPL CALC-MCNC: 80.4 MG/DL (ref 63–159)
LYMPHOCYTES # BLD AUTO: 2.4 K/UL (ref 1–4.8)
LYMPHOCYTES NFR BLD: 27.1 % (ref 18–48)
MCH RBC QN AUTO: 28.9 PG (ref 27–31)
MCHC RBC AUTO-ENTMCNC: 33 G/DL (ref 32–36)
MCV RBC AUTO: 88 FL (ref 82–98)
MONOCYTES # BLD AUTO: 0.4 K/UL (ref 0.3–1)
MONOCYTES NFR BLD: 4.7 % (ref 4–15)
NEUTROPHILS # BLD AUTO: 5.8 K/UL (ref 1.8–7.7)
NEUTROPHILS NFR BLD: 66.2 % (ref 38–73)
NONHDLC SERPL-MCNC: 130 MG/DL
PLATELET # BLD AUTO: 301 K/UL (ref 150–350)
PMV BLD AUTO: 10.2 FL (ref 9.2–12.9)
POTASSIUM SERPL-SCNC: 3.9 MMOL/L (ref 3.5–5.1)
PROT SERPL-MCNC: 7.5 G/DL (ref 6–8.4)
RBC # BLD AUTO: 4.24 M/UL (ref 4–5.4)
SATURATED IRON: 19 % (ref 20–50)
SODIUM SERPL-SCNC: 139 MMOL/L (ref 136–145)
TOTAL IRON BINDING CAPACITY: 382 UG/DL (ref 250–450)
TRANSFERRIN SERPL-MCNC: 258 MG/DL (ref 200–375)
TRIGL SERPL-MCNC: 248 MG/DL (ref 30–150)
TSH SERPL DL<=0.005 MIU/L-ACNC: 2.16 UIU/ML (ref 0.4–4)
WBC # BLD AUTO: 8.7 K/UL (ref 3.9–12.7)

## 2019-04-02 PROCEDURE — 80061 LIPID PANEL: CPT

## 2019-04-02 PROCEDURE — 83540 ASSAY OF IRON: CPT

## 2019-04-02 PROCEDURE — 84443 ASSAY THYROID STIM HORMONE: CPT

## 2019-04-02 PROCEDURE — 85025 COMPLETE CBC W/AUTO DIFF WBC: CPT

## 2019-04-02 PROCEDURE — 82728 ASSAY OF FERRITIN: CPT

## 2019-04-02 PROCEDURE — 80053 COMPREHEN METABOLIC PANEL: CPT

## 2019-04-02 PROCEDURE — 83036 HEMOGLOBIN GLYCOSYLATED A1C: CPT

## 2019-04-02 NOTE — PROGRESS NOTES
Identified patient by name and .  Specimen collected with 23g butterfly using aseptic technqiue by Yohana Lacey. Patient tolerated well.

## 2019-04-03 ENCOUNTER — PATIENT MESSAGE (OUTPATIENT)
Dept: FAMILY MEDICINE | Facility: CLINIC | Age: 44
End: 2019-04-03

## 2019-04-03 DIAGNOSIS — E83.52 HYPERCALCEMIA: Primary | ICD-10-CM

## 2019-04-04 NOTE — TELEPHONE ENCOUNTER
Pt read My Chart message.  Called pt and left her a voicemail to please return my call at [phone] or reply to the My Chart message that Dr. Valdez left her so we can schedule her labs.

## 2019-04-05 NOTE — TELEPHONE ENCOUNTER
Second attempt to reach patient via phone, no answer. LM for return call to clinic. Follow up My Chart message sent.

## 2019-04-18 ENCOUNTER — TELEPHONE (OUTPATIENT)
Dept: FAMILY MEDICINE | Facility: CLINIC | Age: 44
End: 2019-04-18

## 2019-04-18 RX ORDER — OLMESARTAN MEDOXOMIL 20 MG/1
40 TABLET ORAL DAILY
Qty: 180 TABLET | Refills: 0 | Status: SHIPPED | OUTPATIENT
Start: 2019-04-18 | End: 2019-10-25 | Stop reason: SDUPTHER

## 2019-04-18 NOTE — TELEPHONE ENCOUNTER
Sent olemesartan 20 mg 2 tablets a day.     Please make sure the patient is aware that the dose is changed due to pharmacy shortages.     Thanks

## 2019-04-23 ENCOUNTER — CLINICAL SUPPORT (OUTPATIENT)
Dept: FAMILY MEDICINE | Facility: CLINIC | Age: 44
End: 2019-04-23
Payer: COMMERCIAL

## 2019-04-23 DIAGNOSIS — E83.52 HYPERCALCEMIA: ICD-10-CM

## 2019-04-23 PROCEDURE — 80048 BASIC METABOLIC PNL TOTAL CA: CPT

## 2019-05-03 LAB
ANION GAP SERPL CALC-SCNC: 8 MMOL/L (ref 8–16)
BUN SERPL-MCNC: 25 MG/DL (ref 6–20)
CALCIUM SERPL-MCNC: 10.3 MG/DL (ref 8.7–10.5)
CHLORIDE SERPL-SCNC: 103 MMOL/L (ref 95–110)
CO2 SERPL-SCNC: 28 MMOL/L (ref 23–29)
CREAT SERPL-MCNC: 1.2 MG/DL (ref 0.5–1.4)
EST. GFR  (AFRICAN AMERICAN): >60 ML/MIN/1.73 M^2
EST. GFR  (NON AFRICAN AMERICAN): 55 ML/MIN/1.73 M^2
GLUCOSE SERPL-MCNC: 92 MG/DL (ref 70–110)
POTASSIUM SERPL-SCNC: 3.7 MMOL/L (ref 3.5–5.1)
SODIUM SERPL-SCNC: 139 MMOL/L (ref 136–145)

## 2019-05-04 ENCOUNTER — PATIENT MESSAGE (OUTPATIENT)
Dept: FAMILY MEDICINE | Facility: CLINIC | Age: 44
End: 2019-05-04

## 2019-05-31 DIAGNOSIS — Z12.39 BREAST CANCER SCREENING: ICD-10-CM

## 2019-06-07 ENCOUNTER — PATIENT OUTREACH (OUTPATIENT)
Dept: ADMINISTRATIVE | Facility: HOSPITAL | Age: 44
End: 2019-06-07

## 2019-06-07 DIAGNOSIS — Z30.011 ORAL CONTRACEPTION INITIAL PRESCRIPTION: ICD-10-CM

## 2019-06-07 RX ORDER — HYDROCHLOROTHIAZIDE 25 MG/1
TABLET ORAL
Qty: 90 TABLET | Refills: 2 | Status: SHIPPED | OUTPATIENT
Start: 2019-06-07 | End: 2020-03-30 | Stop reason: SDUPTHER

## 2019-06-07 RX ORDER — NORGESTIMATE AND ETHINYL ESTRADIOL 7DAYSX3 LO
1 KIT ORAL DAILY
Qty: 28 TABLET | Refills: 11 | Status: SHIPPED | OUTPATIENT
Start: 2019-06-07 | End: 2021-02-02 | Stop reason: SDUPTHER

## 2019-06-07 NOTE — PROGRESS NOTES
Chart review complete/scrubbed 06/07/2019    Health Maintenance Due   Topic Date Due    Mammogram  10/21/2017

## 2019-06-21 ENCOUNTER — OFFICE VISIT (OUTPATIENT)
Dept: FAMILY MEDICINE | Facility: CLINIC | Age: 44
End: 2019-06-21
Payer: COMMERCIAL

## 2019-06-21 VITALS
RESPIRATION RATE: 18 BRPM | DIASTOLIC BLOOD PRESSURE: 70 MMHG | WEIGHT: 215.38 LBS | HEART RATE: 74 BPM | SYSTOLIC BLOOD PRESSURE: 106 MMHG | HEIGHT: 64 IN | BODY MASS INDEX: 36.77 KG/M2 | OXYGEN SATURATION: 96 % | TEMPERATURE: 98 F

## 2019-06-21 DIAGNOSIS — F32.0 MAJOR DEPRESSIVE DISORDER, SINGLE EPISODE, MILD: ICD-10-CM

## 2019-06-21 DIAGNOSIS — Q61.3 POLYCYSTIC KIDNEY DISEASE: Primary | ICD-10-CM

## 2019-06-21 DIAGNOSIS — E78.5 HYPERLIPIDEMIA, UNSPECIFIED HYPERLIPIDEMIA TYPE: ICD-10-CM

## 2019-06-21 DIAGNOSIS — E88.819 INSULIN RESISTANCE: ICD-10-CM

## 2019-06-21 DIAGNOSIS — Z12.31 ENCOUNTER FOR SCREENING MAMMOGRAM FOR MALIGNANT NEOPLASM OF BREAST: ICD-10-CM

## 2019-06-21 DIAGNOSIS — D50.9 IRON DEFICIENCY ANEMIA, UNSPECIFIED IRON DEFICIENCY ANEMIA TYPE: ICD-10-CM

## 2019-06-21 DIAGNOSIS — F41.0 PANIC ATTACK: ICD-10-CM

## 2019-06-21 DIAGNOSIS — E66.01 CLASS 2 SEVERE OBESITY DUE TO EXCESS CALORIES WITH SERIOUS COMORBIDITY AND BODY MASS INDEX (BMI) OF 36.0 TO 36.9 IN ADULT: ICD-10-CM

## 2019-06-21 DIAGNOSIS — I10 ESSENTIAL HYPERTENSION: ICD-10-CM

## 2019-06-21 DIAGNOSIS — N18.30 CKD (CHRONIC KIDNEY DISEASE) STAGE 3, GFR 30-59 ML/MIN: ICD-10-CM

## 2019-06-21 PROCEDURE — 99214 PR OFFICE/OUTPT VISIT, EST, LEVL IV, 30-39 MIN: ICD-10-PCS | Mod: S$GLB,,, | Performed by: INTERNAL MEDICINE

## 2019-06-21 PROCEDURE — 3074F SYST BP LT 130 MM HG: CPT | Mod: CPTII,S$GLB,, | Performed by: INTERNAL MEDICINE

## 2019-06-21 PROCEDURE — 3078F DIAST BP <80 MM HG: CPT | Mod: CPTII,S$GLB,, | Performed by: INTERNAL MEDICINE

## 2019-06-21 PROCEDURE — 3074F PR MOST RECENT SYSTOLIC BLOOD PRESSURE < 130 MM HG: ICD-10-PCS | Mod: CPTII,S$GLB,, | Performed by: INTERNAL MEDICINE

## 2019-06-21 PROCEDURE — 99214 OFFICE O/P EST MOD 30 MIN: CPT | Mod: S$GLB,,, | Performed by: INTERNAL MEDICINE

## 2019-06-21 PROCEDURE — 3008F BODY MASS INDEX DOCD: CPT | Mod: CPTII,S$GLB,, | Performed by: INTERNAL MEDICINE

## 2019-06-21 PROCEDURE — 3078F PR MOST RECENT DIASTOLIC BLOOD PRESSURE < 80 MM HG: ICD-10-PCS | Mod: CPTII,S$GLB,, | Performed by: INTERNAL MEDICINE

## 2019-06-21 PROCEDURE — 3008F PR BODY MASS INDEX (BMI) DOCUMENTED: ICD-10-PCS | Mod: CPTII,S$GLB,, | Performed by: INTERNAL MEDICINE

## 2019-06-21 NOTE — PROGRESS NOTES
Subjective:       Patient ID: Anyi Moore is a 44 y.o. female.    Medication List with Changes/Refills   Current Medications    ALPRAZOLAM (XANAX) 0.5 MG TABLET    Take 1 tablet (0.5 mg total) by mouth daily as needed for Anxiety. Panic attacks    ASPIRIN (ECOTRIN) 81 MG EC TABLET    Take 1 tablet (81 mg total) by mouth once daily.    DIPHENHYDRAMINE (BENADRYL) 25 MG CAPSULE    Take 1 each (25 mg total) by mouth every 6 (six) hours as needed for Itching.    FISH OIL-OMEGA-3 FATTY ACIDS 300-1,000 MG CAPSULE    Take 2 g by mouth once daily.    HYDROCHLOROTHIAZIDE (HYDRODIURIL) 25 MG TABLET    TAKE 1 TABLET BY MOUTH ONCE DAILY    LABETALOL (NORMODYNE) 100 MG TABLET    Take 1 tablet (100 mg total) by mouth 2 (two) times daily.    METFORMIN (GLUCOPHAGE) 500 MG TABLET    TAKE ONE TABLET BY MOUTH ONCE DAILY WITH BREAKFAST    MULTIVITAMIN (THERAGRAN) PER TABLET    Take 1 tablet by mouth once daily.    NORGESTIMATE-ETHINYL ESTRADIOL (ORTHO TRI-CYCLEN LO) 0.18/0.215/0.25 MG-25 MCG TABLET    TAKE 1 TABLET BY MOUTH ONCE DAILY    OLMESARTAN (BENICAR) 20 MG TABLET    Take 2 tablets (40 mg total) by mouth once daily.    ROSUVASTATIN (CRESTOR) 10 MG TABLET    Take 1 tablet (10 mg total) by mouth once daily.    TIZANIDINE (ZANAFLEX) 2 MG TABLET    Take 2 mg by mouth every 6 (six) hours as needed.    VALACYCLOVIR (VALTREX) 1000 MG TABLET    Take 1 tablet (1,000 mg total) by mouth 2 (two) times daily.       Chief Complaint: Follow-up (6 month follow up )  She is here today to f/u on chronic medical issues.      SHe has a history of depression and PTSD which was previously treated by celexa but has done well off medication. She is sad but denies depression. She is getting out and staying active. She is sleeping well and has a normal appetite.  She denies suicidal ideations. SHe does now have very infrequent panic attacks which respond to xanax. Last was over 6 months ago.  She has xanax (filled #20 on 3/31/2017). She does not  need a refill today.       She has a history of hypertension due to PCKD. She has been on treatment for HTN for some time and is taking olmesartan 40 mg qday, HCTZ 25 mg qday and labetolol 100 mg bid. She does not check her BP at home.   Her PCKD was dx at age 32. She has had u/s in the past and is followed by Dr. Alvarez but has not been seen in over three years. She denies hematuria. Her MGM has PCKD.  Her creatinine was 1.2 on 4/2019 with GFR of 55 which is her baseline.      She has mild hyperlipidemia with last lipids were 173/248/43/80 on 4/2019 taking crestor 10 mg qday.  She is tolerating well without any side effects.  She is on aspirin daily.      She has insulin resistance with HbA1c of 5.5 on 4/2019 due to her PCKD. She continues on metformin 500 mg once a day.  She does not check her glucose.  Her microalbumin is negative on 4/2018.       She has DWAIN due to heavy frequent periods. This has greatly improved since starting BCP. Her last H+H on 4/2019 was normal.      She is exercising regularly doing Pratima 3 times a week and eating healthy.    She is trying to lose weight with a low carbohydrate diet. She has lost 7 lbs.       Pap---7/2016 neg  Mammogram ---10/2016 neg  Tdap----7/2016  Pneumovax---3/2018  Influenza vaccine---9/2018     Review of Systems   Constitutional: Negative for appetite change, fatigue, fever and unexpected weight change.   HENT: Negative for congestion, ear pain, hearing loss, sore throat and trouble swallowing.    Eyes: Negative for pain and visual disturbance.   Respiratory: Negative for cough, chest tightness, shortness of breath and wheezing.    Cardiovascular: Positive for leg swelling. Negative for chest pain and palpitations.   Gastrointestinal: Negative for abdominal pain, blood in stool, constipation, diarrhea, nausea and vomiting.   Endocrine: Negative for polyuria.   Genitourinary: Negative for dysuria and hematuria.   Musculoskeletal: Negative for arthralgias, back pain  "and myalgias.   Skin: Negative for rash.   Neurological: Negative for dizziness, weakness, numbness and headaches.   Hematological: Does not bruise/bleed easily.   Psychiatric/Behavioral: Negative for dysphoric mood, sleep disturbance and suicidal ideas. The patient is nervous/anxious.        Objective:      Vitals:    06/21/19 0828   BP: 106/70   Pulse: 74   Resp: 18   Temp: 98.1 °F (36.7 °C)   SpO2: 96%   Weight: 97.7 kg (215 lb 6.4 oz)   Height: 5' 4" (1.626 m)     Body mass index is 36.97 kg/m².  Physical Exam    General appearance: No acute distress, cooperative  Eyes: PERRL, EOMI, conjunctiva clear  Ears: normal external ear and pinna, tm clear without drainage, canals clear  Nose: Normal mucosa without drainage  Throat: no exudates or erythema, tonsils not enlarged  Mouth: no sores or lesions, moist mucous membranes  Neck: FROM, soft, supple, no thyromegaly, no bruits  Lymph: no anterior or posterior cervical adenopathy  Heart::  Regular rate and rhythm, no murmur  Lung: Clear to ascultation bilaterally, no wheezing, no rales, no rhonchi, no distress  Abdomen: Soft, nontender, no distention, no hepatosplenomegaly, bowel sounds normal, no guarding, no rebound, no peritoneal signs  Skin: no rashes, no lesions  Extremities: no edema, no cyanosis  Neuro: CN 2-12 intact, 5/5 muscle strength upper and lower extremity bilaterally, 2+ DTRs UE and LE bilaterally, normal gait, normal sensation  Peripheral pulses: 2+ pedal pulses bilaterally, good perfusion and color  Musculoskeletal: FROM, good strenth, no tenderness  Joint: normal appearance, no swelling, no warmth, no deformity in all joints    Assessment:       1. Polycystic kidney disease    2. CKD (chronic kidney disease) stage 3, GFR 30-59 ml/min    3. Essential hypertension    4. Hyperlipidemia, unspecified hyperlipidemia type    5. Insulin resistance    6. Major depressive disorder, single episode, mild    7. Panic attack    8. Iron deficiency anemia, " unspecified iron deficiency anemia type    9. Class 2 severe obesity due to excess calories with serious comorbidity and body mass index (BMI) of 36.0 to 36.9 in adult    10. Encounter for screening mammogram for malignant neoplasm of breast        Plan:       Polycystic kidney disease  Renal function stable and she has no symptoms. She is due for renal u/s.   -     US Retroperitoneal Complete (Kidney and; Future; Expected date: 06/21/2019    CKD (chronic kidney disease) stage 3, GFR 30-59 ml/min  Stable and continue to monitor.     Essential hypertension  Good control on this regimen.     Hyperlipidemia, unspecified hyperlipidemia type  Good control on low dose crestor.    Insulin resistance  Good response to metformin and continue to monitor.     Major depressive disorder, single episode, mild  Controlled off medication. She is doing well.     Panic attack  Now rare about once every 6 months that respond to xanax. No refill given today.     Iron deficiency anemia, unspecified iron deficiency anemia type  Resolved after starting birth control pills.     Class 2 severe obesity due to excess calories with serious comorbidity and body mass index (BMI) of 36.0 to 36.9 in adult  She is doing well. She is changing her diet and exercising regularly to help lose weight and prevent diabetes.     Encounter for screening mammogram for malignant neoplasm of breast  -     Mammo Digital Screening Bilat; Future; Expected date: 06/21/2019    Follow up in about 6 months (around 12/21/2019) for chronic medical issues and pap.

## 2019-06-28 ENCOUNTER — HOSPITAL ENCOUNTER (OUTPATIENT)
Dept: RADIOLOGY | Facility: HOSPITAL | Age: 44
Discharge: HOME | End: 2019-06-28
Attending: INTERNAL MEDICINE
Payer: COMMERCIAL

## 2019-06-28 DIAGNOSIS — Z12.31 ENCOUNTER FOR SCREENING MAMMOGRAM FOR MALIGNANT NEOPLASM OF BREAST: ICD-10-CM

## 2019-06-28 PROCEDURE — 77067 MAMMO DIGITAL SCREENING BILAT WITH TOMOSYNTHESIS_CAD: ICD-10-PCS | Mod: 26,,, | Performed by: RADIOLOGY

## 2019-06-28 PROCEDURE — 77063 BREAST TOMOSYNTHESIS BI: CPT | Mod: 26,,, | Performed by: RADIOLOGY

## 2019-06-28 PROCEDURE — 77063 MAMMO DIGITAL SCREENING BILAT WITH TOMOSYNTHESIS_CAD: ICD-10-PCS | Mod: 26,,, | Performed by: RADIOLOGY

## 2019-06-28 PROCEDURE — 77067 SCR MAMMO BI INCL CAD: CPT | Mod: 26,,, | Performed by: RADIOLOGY

## 2019-06-28 PROCEDURE — 77067 SCR MAMMO BI INCL CAD: CPT | Mod: TC

## 2019-08-07 DIAGNOSIS — E78.5 HYPERLIPIDEMIA, UNSPECIFIED HYPERLIPIDEMIA TYPE: ICD-10-CM

## 2019-08-08 RX ORDER — ROSUVASTATIN CALCIUM 10 MG/1
TABLET, COATED ORAL
Qty: 90 TABLET | Refills: 3 | Status: SHIPPED | OUTPATIENT
Start: 2019-08-08 | End: 2021-05-23

## 2019-08-15 ENCOUNTER — PATIENT MESSAGE (OUTPATIENT)
Dept: FAMILY MEDICINE | Facility: CLINIC | Age: 44
End: 2019-08-15

## 2019-08-30 ENCOUNTER — PATIENT OUTREACH (OUTPATIENT)
Dept: ADMINISTRATIVE | Facility: HOSPITAL | Age: 44
End: 2019-08-30

## 2019-09-07 DIAGNOSIS — E11.9 DIABETES TYPE 2, CONTROLLED: ICD-10-CM

## 2019-09-07 RX ORDER — LABETALOL 100 MG/1
TABLET, FILM COATED ORAL
Qty: 180 TABLET | Refills: 2 | Status: SHIPPED | OUTPATIENT
Start: 2019-09-07 | End: 2021-08-02 | Stop reason: SDUPTHER

## 2019-09-07 RX ORDER — METFORMIN HYDROCHLORIDE 500 MG/1
TABLET ORAL
Qty: 90 TABLET | Refills: 3 | Status: SHIPPED | OUTPATIENT
Start: 2019-09-07 | End: 2021-02-02 | Stop reason: SDUPTHER

## 2019-10-27 RX ORDER — OLMESARTAN MEDOXOMIL 20 MG/1
TABLET ORAL
Qty: 180 TABLET | Refills: 3 | Status: SHIPPED | OUTPATIENT
Start: 2019-10-27 | End: 2021-02-02

## 2020-03-27 ENCOUNTER — PATIENT MESSAGE (OUTPATIENT)
Dept: FAMILY MEDICINE | Facility: CLINIC | Age: 45
End: 2020-03-27

## 2020-03-30 DIAGNOSIS — B00.2 HERPES VIRUS INFECTION OF ORAL MUCOSA: ICD-10-CM

## 2020-03-30 DIAGNOSIS — E11.9 DIABETES TYPE 2, CONTROLLED: ICD-10-CM

## 2020-03-30 DIAGNOSIS — F41.0 PANIC ATTACK: ICD-10-CM

## 2020-03-31 RX ORDER — VALACYCLOVIR HYDROCHLORIDE 1 G/1
1000 TABLET, FILM COATED ORAL 2 TIMES DAILY
Qty: 14 TABLET | Refills: 3 | Status: SHIPPED | OUTPATIENT
Start: 2020-03-31 | End: 2021-12-04

## 2020-03-31 RX ORDER — METFORMIN HYDROCHLORIDE 500 MG/1
500 TABLET ORAL
Qty: 90 TABLET | Refills: 3 | Status: SHIPPED | OUTPATIENT
Start: 2020-03-31 | End: 2022-01-28

## 2020-03-31 RX ORDER — LABETALOL 100 MG/1
100 TABLET, FILM COATED ORAL 2 TIMES DAILY
Qty: 180 TABLET | Refills: 2 | Status: SHIPPED | OUTPATIENT
Start: 2020-03-31 | End: 2021-02-02 | Stop reason: SDUPTHER

## 2020-03-31 RX ORDER — ALPRAZOLAM 0.5 MG/1
0.5 TABLET ORAL DAILY PRN
Qty: 20 TABLET | Refills: 0 | Status: SHIPPED | OUTPATIENT
Start: 2020-03-31 | End: 2022-01-28

## 2020-03-31 RX ORDER — HYDROCHLOROTHIAZIDE 25 MG/1
25 TABLET ORAL DAILY
Qty: 90 TABLET | Refills: 2 | Status: SHIPPED | OUTPATIENT
Start: 2020-03-31 | End: 2021-02-02 | Stop reason: SDUPTHER

## 2020-04-02 ENCOUNTER — TELEPHONE (OUTPATIENT)
Dept: FAMILY MEDICINE | Facility: CLINIC | Age: 45
End: 2020-04-02

## 2020-04-02 ENCOUNTER — PATIENT MESSAGE (OUTPATIENT)
Dept: FAMILY MEDICINE | Facility: CLINIC | Age: 45
End: 2020-04-02

## 2020-04-02 ENCOUNTER — OFFICE VISIT (OUTPATIENT)
Dept: FAMILY MEDICINE | Facility: CLINIC | Age: 45
End: 2020-04-02
Payer: COMMERCIAL

## 2020-04-02 VITALS
DIASTOLIC BLOOD PRESSURE: 72 MMHG | HEIGHT: 64 IN | HEART RATE: 76 BPM | WEIGHT: 240 LBS | SYSTOLIC BLOOD PRESSURE: 102 MMHG | BODY MASS INDEX: 40.97 KG/M2

## 2020-04-02 DIAGNOSIS — Q61.3 POLYCYSTIC KIDNEY DISEASE: ICD-10-CM

## 2020-04-02 DIAGNOSIS — D50.9 IRON DEFICIENCY ANEMIA, UNSPECIFIED IRON DEFICIENCY ANEMIA TYPE: ICD-10-CM

## 2020-04-02 DIAGNOSIS — F32.0 MAJOR DEPRESSIVE DISORDER, SINGLE EPISODE, MILD: ICD-10-CM

## 2020-04-02 DIAGNOSIS — Z12.31 ENCOUNTER FOR SCREENING MAMMOGRAM FOR MALIGNANT NEOPLASM OF BREAST: ICD-10-CM

## 2020-04-02 DIAGNOSIS — I10 ESSENTIAL HYPERTENSION: Primary | ICD-10-CM

## 2020-04-02 DIAGNOSIS — E88.819 INSULIN RESISTANCE: ICD-10-CM

## 2020-04-02 DIAGNOSIS — F41.0 PANIC ATTACK: ICD-10-CM

## 2020-04-02 DIAGNOSIS — K21.9 GASTROESOPHAGEAL REFLUX DISEASE WITHOUT ESOPHAGITIS: ICD-10-CM

## 2020-04-02 DIAGNOSIS — F41.1 GAD (GENERALIZED ANXIETY DISORDER): ICD-10-CM

## 2020-04-02 DIAGNOSIS — E66.01 MORBID OBESITY WITH BMI OF 40.0-44.9, ADULT: ICD-10-CM

## 2020-04-02 DIAGNOSIS — N18.30 CKD (CHRONIC KIDNEY DISEASE) STAGE 3, GFR 30-59 ML/MIN: ICD-10-CM

## 2020-04-02 DIAGNOSIS — E78.5 HYPERLIPIDEMIA, UNSPECIFIED HYPERLIPIDEMIA TYPE: ICD-10-CM

## 2020-04-02 DIAGNOSIS — Z11.4 SCREENING FOR HIV (HUMAN IMMUNODEFICIENCY VIRUS): ICD-10-CM

## 2020-04-02 PROCEDURE — 99214 OFFICE O/P EST MOD 30 MIN: CPT | Mod: 95,,, | Performed by: INTERNAL MEDICINE

## 2020-04-02 PROCEDURE — 3008F BODY MASS INDEX DOCD: CPT | Mod: CPTII,,, | Performed by: INTERNAL MEDICINE

## 2020-04-02 PROCEDURE — 3074F PR MOST RECENT SYSTOLIC BLOOD PRESSURE < 130 MM HG: ICD-10-PCS | Mod: CPTII,,, | Performed by: INTERNAL MEDICINE

## 2020-04-02 PROCEDURE — 3074F SYST BP LT 130 MM HG: CPT | Mod: CPTII,,, | Performed by: INTERNAL MEDICINE

## 2020-04-02 PROCEDURE — 3008F PR BODY MASS INDEX (BMI) DOCUMENTED: ICD-10-PCS | Mod: CPTII,,, | Performed by: INTERNAL MEDICINE

## 2020-04-02 PROCEDURE — 99214 PR OFFICE/OUTPT VISIT, EST, LEVL IV, 30-39 MIN: ICD-10-PCS | Mod: 95,,, | Performed by: INTERNAL MEDICINE

## 2020-04-02 PROCEDURE — 3078F PR MOST RECENT DIASTOLIC BLOOD PRESSURE < 80 MM HG: ICD-10-PCS | Mod: CPTII,,, | Performed by: INTERNAL MEDICINE

## 2020-04-02 PROCEDURE — 3078F DIAST BP <80 MM HG: CPT | Mod: CPTII,,, | Performed by: INTERNAL MEDICINE

## 2020-04-02 RX ORDER — CITALOPRAM 20 MG/1
20 TABLET, FILM COATED ORAL DAILY
Qty: 90 TABLET | Refills: 3 | Status: SHIPPED | OUTPATIENT
Start: 2020-04-02 | End: 2021-07-03

## 2020-04-02 RX ORDER — PANTOPRAZOLE SODIUM 40 MG/1
40 TABLET, DELAYED RELEASE ORAL DAILY
Qty: 90 TABLET | Refills: 3 | Status: SHIPPED | OUTPATIENT
Start: 2020-04-02 | End: 2021-07-21 | Stop reason: SDUPTHER

## 2020-04-02 NOTE — PROGRESS NOTES
Subjective:       Patient ID: Anyi Moore is a 45 y.o. female.    Medication List with Changes/Refills   New Medications    CITALOPRAM (CELEXA) 20 MG TABLET    Take 1 tablet (20 mg total) by mouth once daily.    PANTOPRAZOLE (PROTONIX) 40 MG TABLET    Take 1 tablet (40 mg total) by mouth once daily.   Current Medications    ALPRAZOLAM (XANAX) 0.5 MG TABLET    Take 1 tablet (0.5 mg total) by mouth daily as needed for Anxiety. Panic attacks    ASPIRIN (ECOTRIN) 81 MG EC TABLET    Take 1 tablet (81 mg total) by mouth once daily.    DIPHENHYDRAMINE (BENADRYL) 25 MG CAPSULE    Take 1 each (25 mg total) by mouth every 6 (six) hours as needed for Itching.    FISH OIL-OMEGA-3 FATTY ACIDS 300-1,000 MG CAPSULE    Take 2 g by mouth once daily.    HYDROCHLOROTHIAZIDE (HYDRODIURIL) 25 MG TABLET    Take 1 tablet (25 mg total) by mouth once daily.    LABETALOL (NORMODYNE) 100 MG TABLET    TAKE 1 TABLET BY MOUTH TWICE DAILY    LABETALOL (NORMODYNE) 100 MG TABLET    Take 1 tablet (100 mg total) by mouth 2 (two) times daily.    METFORMIN (GLUCOPHAGE) 500 MG TABLET    TAKE 1 TABLET BY MOUTH DAILY WITH BREAKFAST    METFORMIN (GLUCOPHAGE) 500 MG TABLET    Take 1 tablet (500 mg total) by mouth daily with breakfast.    MULTIVITAMIN (THERAGRAN) PER TABLET    Take 1 tablet by mouth once daily.    NORGESTIMATE-ETHINYL ESTRADIOL (ORTHO TRI-CYCLEN LO) 0.18/0.215/0.25 MG-25 MCG TABLET    TAKE 1 TABLET BY MOUTH ONCE DAILY    OLMESARTAN (BENICAR) 20 MG TABLET    TAKE 2 TABLETS BY MOUTH ONCE DAILY    ROSUVASTATIN (CRESTOR) 10 MG TABLET    TAKE 1 TABLET BY MOUTH ONCE DAILY    TIZANIDINE (ZANAFLEX) 2 MG TABLET    Take 2 mg by mouth every 6 (six) hours as needed.    VALACYCLOVIR (VALTREX) 1000 MG TABLET    Take 1 tablet (1,000 mg total) by mouth 2 (two) times daily. for 7 days       Chief Complaint: No chief complaint on file.  The patient location is: work  The chief complaint leading to consultation is: chronic medical issues and  anxiety  Visit type: Virtual visit with synchronous audio and video  Total time spent with patient: 25 minutes  Each patient to whom he or she provides medical services by telemedicine is:  (1) informed of the relationship between the physician and patient and the respective role of any other health care provider with respect to management of the patient; and (2) notified that he or she may decline to receive medical services by telemedicine and may withdraw from such care at any time.    She is here today to f/u on chronic medical issues.     SHe has a history of depression and PTSD which was previously treated by celexa but has been off this for sometime. 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 She has xanax at home for panic attacks which prior to this she rarely used (less than 20 pills a year) but is now taking everyday. She says her depression is stable.  No suicidal ideations. She is having trouble sleeping.  She is having panic attacks daily on the way to work.  She would like to restart her celexa.      She does report increase reflux with her increased stress. She is having daily and nightly reflux symptoms. She is taking OTC tums.       She has a history of hypertension due to PCKD. She has been on treatment for HTN for some time and is taking olmesartan 40 mg qday, HCTZ 25 mg qday and labetolol 100 mg bid. She does not check her BP at home.   Her PCKD was dx at age 32. She has had u/s in the past and is followed by Dr. Alvarez but has not been seen in over three years. She denies hematuria. Her MGM has PCKD.  Her creatinine was 1.2 on 4/2019 with GFR of 55 which is her baseline.      She has mild hyperlipidemia with last lipids were 173/248/43/80 on 4/2019 taking crestor 10 mg qday.  She is tolerating well without any side effects.  She is on aspirin daily.      She has insulin resistance with HbA1c of 5.5 on 4/2019 due to her PCKD. She continues on  "metformin 500 mg once a day.  She does not check her glucose.  Her microalbumin is negative on 4/2018.       She has DWAIN due to heavy frequent periods. This has greatly improved since starting BCP. Her last H+H on 4/2019 was normal.      She is with her  and children.  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 She reports increasing weight since she is eating poorly with over 25 lbs since last seen.      Pap---7/2016 neg  Mammogram ---6/2019 neg  Tdap----7/2016  Pneumovax---3/2018  Influenza vaccine---9/2019    Review of Systems   Constitutional: Positive for activity change. Negative for appetite change, fatigue, fever and unexpected weight change.   HENT: Negative for congestion, ear pain, hearing loss, rhinorrhea, sore throat and trouble swallowing.    Eyes: Negative for pain, discharge and visual disturbance.   Respiratory: Positive for chest tightness. Negative for cough, shortness of breath and wheezing.    Cardiovascular: Positive for palpitations. Negative for chest pain and leg swelling.   Gastrointestinal: Negative for abdominal pain, blood in stool, constipation, diarrhea, nausea and vomiting.   Endocrine: Negative for polydipsia and polyuria.   Genitourinary: Negative for difficulty urinating, dysuria, hematuria and menstrual problem.   Musculoskeletal: Negative for arthralgias, back pain, joint swelling, myalgias and neck pain.   Skin: Negative for rash.   Neurological: Negative for dizziness, weakness, numbness and headaches.   Hematological: Does not bruise/bleed easily.   Psychiatric/Behavioral: Positive for dysphoric mood. Negative for confusion, sleep disturbance and suicidal ideas. The patient is nervous/anxious.        Objective:      Vitals:    04/02/20 1528   BP: 102/72   Pulse: 76   Weight: 108.9 kg (240 lb)   Height: 5' 4" (1.626 m)     Body mass index is 41.2 kg/m².  Physical Exam    Virtual visit  Alert,   Crying at times  No respiratory distress  Assessment:       1. Essential " hypertension    2. Hyperlipidemia, unspecified hyperlipidemia type    3. Polycystic kidney disease    4. CKD (chronic kidney disease) stage 3, GFR 30-59 ml/min    5. Insulin resistance    6. Major depressive disorder, single episode, mild    7. LORETA (generalized anxiety disorder)    8. Panic attack    9. Gastroesophageal reflux disease without esophagitis    10. Iron deficiency anemia, unspecified iron deficiency anemia type    11. Morbid obesity with BMI of 40.0-44.9, adult    12. Encounter for screening mammogram for malignant neoplasm of breast    13. Screening for HIV (human immunodeficiency virus)        Plan:       Essential hypertension  Well controlled and continue current regimen. She is due for labs.   -     CBC auto differential; Future; Expected date: 04/02/2020  -     Comprehensive metabolic panel; Future; Expected date: 04/02/2020  -     Lipid panel; Future; Expected date: 04/02/2020  -     TSH; Future; Expected date: 04/02/2020    Hyperlipidemia, unspecified hyperlipidemia type  Good control on low dose crestor. She is on aspirin daily.     Polycystic kidney disease with CKD (chronic kidney disease) stage 3, GFR 30-59 ml/min  She has been stable and continue to follow her renal function. She is due to recheck.     Insulin resistance  She continues on metformin and is doing well.   -     Hemoglobin A1c; Future; Expected date: 04/02/2020    Major depressive disorder, single episode, mild  Controlled but worsening anxiety. Will restart celexa.     LORETA (generalized anxiety disorder)  Uncontrolled and will restart celexa 20 mg qday. She will take 1/2 tablet for one week and then increase to one tablet daily.   -     citalopram (CELEXA) 20 MG tablet; Take 1 tablet (20 mg total) by mouth once daily.  Dispense: 90 tablet; Refill: 3    Panic attack  She has xanax that I advised she use only for panic attacks.  Hopefully the celexa will decrease the amount of attacks she is having.  No evidence of abuse by .      Gastroesophageal reflux disease without esophagitis  Increasing symptoms and will start pantoprazole daily.   -     pantoprazole (PROTONIX) 40 MG tablet; Take 1 tablet (40 mg total) by mouth once daily.  Dispense: 90 tablet; Refill: 3    Iron deficiency anemia, unspecified iron deficiency anemia type  Stable and due to recheck iron levels.   -     Iron and TIBC; Future; Expected date: 04/02/2020  -     Ferritin; Future; Expected date: 04/02/2020    Morbid obesity with BMI of 40.0-44.9, adult  Long discussion on the benefits of healthy eating and regular exercise to help lose weight and help control hypertension, insulin resistance and hyperlipidemia. She has actively changed her diet to low carbohydrate diet. She is considering bariatric surgery.     Encounter for screening mammogram for malignant neoplasm of breast  -     Mammo Digital Screening Bilat; Future; Expected date: 04/02/2020    Screening for HIV (human immunodeficiency virus)  -     HIV 1/2 Ag/Ab (4th Gen); Future; Expected date: 04/02/2020    Follow up in about 6 months (around 10/2/2020) for chronic medical issues.

## 2020-04-02 NOTE — TELEPHONE ENCOUNTER
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

## 2020-04-02 NOTE — Clinical Note
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

## 2020-04-03 ENCOUNTER — APPOINTMENT (OUTPATIENT)
Dept: LAB | Facility: HOSPITAL | Age: 45
End: 2020-04-03
Attending: INTERNAL MEDICINE
Payer: COMMERCIAL

## 2020-04-03 ENCOUNTER — CLINICAL SUPPORT (OUTPATIENT)
Dept: FAMILY MEDICINE | Facility: CLINIC | Age: 45
End: 2020-04-03
Payer: COMMERCIAL

## 2020-04-03 DIAGNOSIS — E88.819 INSULIN RESISTANCE: ICD-10-CM

## 2020-04-03 DIAGNOSIS — D50.9 IRON DEFICIENCY ANEMIA, UNSPECIFIED IRON DEFICIENCY ANEMIA TYPE: ICD-10-CM

## 2020-04-03 DIAGNOSIS — I10 ESSENTIAL HYPERTENSION: ICD-10-CM

## 2020-04-03 DIAGNOSIS — Z11.4 SCREENING FOR HIV (HUMAN IMMUNODEFICIENCY VIRUS): ICD-10-CM

## 2020-04-03 LAB
ALBUMIN SERPL BCP-MCNC: 4 G/DL (ref 3.5–5.2)
ALP SERPL-CCNC: 71 U/L (ref 55–135)
ALT SERPL W/O P-5'-P-CCNC: 18 U/L (ref 10–44)
ANION GAP SERPL CALC-SCNC: 11 MMOL/L (ref 8–16)
AST SERPL-CCNC: 14 U/L (ref 10–40)
BASOPHILS # BLD AUTO: 0.05 K/UL (ref 0–0.2)
BASOPHILS NFR BLD: 0.5 % (ref 0–1.9)
BILIRUB SERPL-MCNC: 0.5 MG/DL (ref 0.1–1)
BUN SERPL-MCNC: 15 MG/DL (ref 6–20)
CALCIUM SERPL-MCNC: 10.2 MG/DL (ref 8.7–10.5)
CHLORIDE SERPL-SCNC: 102 MMOL/L (ref 95–110)
CHOLEST SERPL-MCNC: 166 MG/DL (ref 120–199)
CHOLEST/HDLC SERPL: 3.9 {RATIO} (ref 2–5)
CO2 SERPL-SCNC: 26 MMOL/L (ref 23–29)
CREAT SERPL-MCNC: 1.3 MG/DL (ref 0.5–1.4)
DIFFERENTIAL METHOD: ABNORMAL
EOSINOPHIL # BLD AUTO: 0.2 K/UL (ref 0–0.5)
EOSINOPHIL NFR BLD: 2.1 % (ref 0–8)
ERYTHROCYTE [DISTWIDTH] IN BLOOD BY AUTOMATED COUNT: 13.2 % (ref 11.5–14.5)
EST. GFR  (AFRICAN AMERICAN): 57 ML/MIN/1.73 M^2
EST. GFR  (NON AFRICAN AMERICAN): 50 ML/MIN/1.73 M^2
ESTIMATED AVG GLUCOSE: 128 MG/DL (ref 68–131)
FERRITIN SERPL-MCNC: 47 NG/ML (ref 20–300)
GLUCOSE SERPL-MCNC: 129 MG/DL (ref 70–110)
HBA1C MFR BLD HPLC: 6.1 % (ref 4–5.6)
HCT VFR BLD AUTO: 37.4 % (ref 37–48.5)
HDLC SERPL-MCNC: 43 MG/DL (ref 40–75)
HDLC SERPL: 25.9 % (ref 20–50)
HGB BLD-MCNC: 11.7 G/DL (ref 12–16)
IMM GRANULOCYTES # BLD AUTO: 0.04 K/UL (ref 0–0.04)
IMM GRANULOCYTES NFR BLD AUTO: 0.4 % (ref 0–0.5)
IRON SERPL-MCNC: 54 UG/DL (ref 30–160)
LDLC SERPL CALC-MCNC: 82 MG/DL (ref 63–159)
LYMPHOCYTES # BLD AUTO: 2.4 K/UL (ref 1–4.8)
LYMPHOCYTES NFR BLD: 25.9 % (ref 18–48)
MCH RBC QN AUTO: 29.8 PG (ref 27–31)
MCHC RBC AUTO-ENTMCNC: 31.3 G/DL (ref 32–36)
MCV RBC AUTO: 95 FL (ref 82–98)
MONOCYTES # BLD AUTO: 0.6 K/UL (ref 0.3–1)
MONOCYTES NFR BLD: 6.7 % (ref 4–15)
NEUTROPHILS # BLD AUTO: 5.9 K/UL (ref 1.8–7.7)
NEUTROPHILS NFR BLD: 64.4 % (ref 38–73)
NONHDLC SERPL-MCNC: 123 MG/DL
NRBC BLD-RTO: 0 /100 WBC
PLATELET # BLD AUTO: 296 K/UL (ref 150–350)
PMV BLD AUTO: 11.5 FL (ref 9.2–12.9)
POTASSIUM SERPL-SCNC: 3.6 MMOL/L (ref 3.5–5.1)
PROT SERPL-MCNC: 7.3 G/DL (ref 6–8.4)
RBC # BLD AUTO: 3.93 M/UL (ref 4–5.4)
SATURATED IRON: 14 % (ref 20–50)
SODIUM SERPL-SCNC: 139 MMOL/L (ref 136–145)
TOTAL IRON BINDING CAPACITY: 389 UG/DL (ref 250–450)
TRANSFERRIN SERPL-MCNC: 263 MG/DL (ref 200–375)
TRIGL SERPL-MCNC: 205 MG/DL (ref 30–150)
TSH SERPL DL<=0.005 MIU/L-ACNC: 2.2 UIU/ML (ref 0.4–4)
WBC # BLD AUTO: 9.23 K/UL (ref 3.9–12.7)

## 2020-04-03 PROCEDURE — 80053 COMPREHEN METABOLIC PANEL: CPT

## 2020-04-03 PROCEDURE — 83036 HEMOGLOBIN GLYCOSYLATED A1C: CPT

## 2020-04-03 PROCEDURE — 86703 HIV-1/HIV-2 1 RESULT ANTBDY: CPT

## 2020-04-03 PROCEDURE — 83540 ASSAY OF IRON: CPT

## 2020-04-03 PROCEDURE — 80061 LIPID PANEL: CPT

## 2020-04-03 PROCEDURE — 82728 ASSAY OF FERRITIN: CPT

## 2020-04-03 PROCEDURE — 85025 COMPLETE CBC W/AUTO DIFF WBC: CPT

## 2020-04-03 PROCEDURE — 84443 ASSAY THYROID STIM HORMONE: CPT

## 2020-04-03 NOTE — PROGRESS NOTES
ID patient by name and date of birth.  Specimen collected with 23g butterfly to right AC using aseptic technique. Patient tolerated well.

## 2020-04-06 ENCOUNTER — PATIENT MESSAGE (OUTPATIENT)
Dept: FAMILY MEDICINE | Facility: CLINIC | Age: 45
End: 2020-04-06

## 2020-04-06 LAB — HIV 1+2 AB+HIV1 P24 AG SERPL QL IA: NEGATIVE

## 2020-04-07 ENCOUNTER — PATIENT MESSAGE (OUTPATIENT)
Dept: FAMILY MEDICINE | Facility: CLINIC | Age: 45
End: 2020-04-07

## 2020-04-21 DIAGNOSIS — Z01.84 ANTIBODY RESPONSE EXAMINATION: ICD-10-CM

## 2020-05-05 ENCOUNTER — PATIENT MESSAGE (OUTPATIENT)
Dept: ADMINISTRATIVE | Facility: HOSPITAL | Age: 45
End: 2020-05-05

## 2020-05-21 DIAGNOSIS — Z01.84 ANTIBODY RESPONSE EXAMINATION: ICD-10-CM

## 2020-06-20 DIAGNOSIS — Z01.84 ANTIBODY RESPONSE EXAMINATION: ICD-10-CM

## 2020-07-20 DIAGNOSIS — Z01.84 ANTIBODY RESPONSE EXAMINATION: ICD-10-CM

## 2020-08-05 ENCOUNTER — PATIENT OUTREACH (OUTPATIENT)
Dept: ADMINISTRATIVE | Facility: HOSPITAL | Age: 45
End: 2020-08-05

## 2020-08-05 NOTE — PROGRESS NOTES
Non-compliant GAP report chart review - Chart review completed for the following HM test if overdue  (Mammogram, Colonoscopy, Cervical Cancer Screening,  Diabetic lab testing, and/or Dilated EYE EXAM)  08/05/2020    Care Everywhere and Media reports - updates requested and reviewed.        [facility] and [facility] reviewed.  Pap Smear and/or  LABS   No recent report    DIS reviewed for test needed.  Mammogram                Health Maintenance Due   Topic Date Due    Hepatitis C Screening  1975    Cervical Cancer Screening  07/06/2019    Mammogram  06/28/2020

## 2020-08-19 DIAGNOSIS — Z01.84 ANTIBODY RESPONSE EXAMINATION: ICD-10-CM

## 2020-09-11 ENCOUNTER — PATIENT OUTREACH (OUTPATIENT)
Dept: ADMINISTRATIVE | Facility: HOSPITAL | Age: 45
End: 2020-09-11

## 2020-09-11 NOTE — PROGRESS NOTES
Non-compliant GAP report chart review - Chart review completed for the following HM test if overdue  (Mammogram, Colonoscopy, Cervical Cancer Screening,  Diabetic lab testing, and/or Dilated EYE EXAM)  09/11/2020    Care Everywhere and Media reports - updates requested and reviewed.        [facility] and [facility] reviewed.  Pap Smear and/or  LABS       Recently outreached/reviewed      Health Maintenance Due   Topic Date Due    Hepatitis C Screening  1975    Cervical Cancer Screening  07/06/2019    Mammogram  06/28/2020    Influenza Vaccine (1) 08/01/2020

## 2020-09-18 DIAGNOSIS — Z01.84 ANTIBODY RESPONSE EXAMINATION: ICD-10-CM

## 2020-10-18 DIAGNOSIS — Z01.84 ANTIBODY RESPONSE EXAMINATION: ICD-10-CM

## 2020-11-17 DIAGNOSIS — Z01.84 ANTIBODY RESPONSE EXAMINATION: ICD-10-CM

## 2021-02-02 ENCOUNTER — OFFICE VISIT (OUTPATIENT)
Dept: FAMILY MEDICINE | Facility: CLINIC | Age: 46
End: 2021-02-02
Payer: COMMERCIAL

## 2021-02-02 VITALS
OXYGEN SATURATION: 98 % | HEART RATE: 64 BPM | TEMPERATURE: 99 F | WEIGHT: 258.5 LBS | BODY MASS INDEX: 44.13 KG/M2 | SYSTOLIC BLOOD PRESSURE: 144 MMHG | DIASTOLIC BLOOD PRESSURE: 90 MMHG | HEIGHT: 64 IN

## 2021-02-02 DIAGNOSIS — N18.31 STAGE 3A CHRONIC KIDNEY DISEASE: ICD-10-CM

## 2021-02-02 DIAGNOSIS — E88.819 INSULIN RESISTANCE: ICD-10-CM

## 2021-02-02 DIAGNOSIS — I10 ESSENTIAL HYPERTENSION: Primary | ICD-10-CM

## 2021-02-02 DIAGNOSIS — F41.0 PANIC ATTACK: ICD-10-CM

## 2021-02-02 DIAGNOSIS — Z12.31 ENCOUNTER FOR SCREENING MAMMOGRAM FOR MALIGNANT NEOPLASM OF BREAST: ICD-10-CM

## 2021-02-02 DIAGNOSIS — D50.9 IRON DEFICIENCY ANEMIA, UNSPECIFIED IRON DEFICIENCY ANEMIA TYPE: ICD-10-CM

## 2021-02-02 DIAGNOSIS — E78.5 HYPERLIPIDEMIA, UNSPECIFIED HYPERLIPIDEMIA TYPE: ICD-10-CM

## 2021-02-02 DIAGNOSIS — Z11.59 ENCOUNTER FOR HEPATITIS C SCREENING TEST FOR LOW RISK PATIENT: ICD-10-CM

## 2021-02-02 DIAGNOSIS — E66.01 MORBID OBESITY WITH BMI OF 40.0-44.9, ADULT: ICD-10-CM

## 2021-02-02 DIAGNOSIS — F41.1 GAD (GENERALIZED ANXIETY DISORDER): ICD-10-CM

## 2021-02-02 DIAGNOSIS — Q61.3 POLYCYSTIC KIDNEY DISEASE: ICD-10-CM

## 2021-02-02 DIAGNOSIS — Z30.011 ORAL CONTRACEPTION INITIAL PRESCRIPTION: ICD-10-CM

## 2021-02-02 DIAGNOSIS — F32.0 MAJOR DEPRESSIVE DISORDER, SINGLE EPISODE, MILD: ICD-10-CM

## 2021-02-02 DIAGNOSIS — R51.9 NONINTRACTABLE EPISODIC HEADACHE, UNSPECIFIED HEADACHE TYPE: ICD-10-CM

## 2021-02-02 LAB
ALBUMIN/CREAT UR: 47.2 UG/MG (ref 0–30)
CREAT UR-MCNC: 53 MG/DL (ref 15–325)
MICROALBUMIN UR DL<=1MG/L-MCNC: 25 UG/ML

## 2021-02-02 PROCEDURE — 82570 ASSAY OF URINE CREATININE: CPT

## 2021-02-02 PROCEDURE — 3008F PR BODY MASS INDEX (BMI) DOCUMENTED: ICD-10-PCS | Mod: CPTII,S$GLB,, | Performed by: INTERNAL MEDICINE

## 2021-02-02 PROCEDURE — 1126F PR PAIN SEVERITY QUANTIFIED, NO PAIN PRESENT: ICD-10-PCS | Mod: S$GLB,,, | Performed by: INTERNAL MEDICINE

## 2021-02-02 PROCEDURE — 3008F BODY MASS INDEX DOCD: CPT | Mod: CPTII,S$GLB,, | Performed by: INTERNAL MEDICINE

## 2021-02-02 PROCEDURE — 99214 OFFICE O/P EST MOD 30 MIN: CPT | Mod: S$GLB,,, | Performed by: INTERNAL MEDICINE

## 2021-02-02 PROCEDURE — 3080F PR MOST RECENT DIASTOLIC BLOOD PRESSURE >= 90 MM HG: ICD-10-PCS | Mod: CPTII,S$GLB,, | Performed by: INTERNAL MEDICINE

## 2021-02-02 PROCEDURE — 3080F DIAST BP >= 90 MM HG: CPT | Mod: CPTII,S$GLB,, | Performed by: INTERNAL MEDICINE

## 2021-02-02 PROCEDURE — 3077F SYST BP >= 140 MM HG: CPT | Mod: CPTII,S$GLB,, | Performed by: INTERNAL MEDICINE

## 2021-02-02 PROCEDURE — 3077F PR MOST RECENT SYSTOLIC BLOOD PRESSURE >= 140 MM HG: ICD-10-PCS | Mod: CPTII,S$GLB,, | Performed by: INTERNAL MEDICINE

## 2021-02-02 PROCEDURE — 99214 PR OFFICE/OUTPT VISIT, EST, LEVL IV, 30-39 MIN: ICD-10-PCS | Mod: S$GLB,,, | Performed by: INTERNAL MEDICINE

## 2021-02-02 PROCEDURE — 82043 UR ALBUMIN QUANTITATIVE: CPT

## 2021-02-02 PROCEDURE — 1126F AMNT PAIN NOTED NONE PRSNT: CPT | Mod: S$GLB,,, | Performed by: INTERNAL MEDICINE

## 2021-02-02 RX ORDER — HYDROCHLOROTHIAZIDE 25 MG/1
25 TABLET ORAL DAILY
Qty: 90 TABLET | Refills: 2 | Status: SHIPPED | OUTPATIENT
Start: 2021-02-02 | End: 2022-03-02

## 2021-02-02 RX ORDER — NORGESTIMATE AND ETHINYL ESTRADIOL 7DAYSX3 LO
1 KIT ORAL DAILY
Qty: 90 TABLET | Refills: 3 | Status: SHIPPED | OUTPATIENT
Start: 2021-02-02 | End: 2022-05-03

## 2021-02-02 RX ORDER — OLMESARTAN MEDOXOMIL 40 MG/1
40 TABLET ORAL DAILY
Qty: 90 TABLET | Refills: 3 | Status: SHIPPED | OUTPATIENT
Start: 2021-02-02 | End: 2022-05-31

## 2021-02-09 ENCOUNTER — PATIENT OUTREACH (OUTPATIENT)
Dept: ADMINISTRATIVE | Facility: HOSPITAL | Age: 46
End: 2021-02-09

## 2021-04-13 ENCOUNTER — PATIENT MESSAGE (OUTPATIENT)
Dept: FAMILY MEDICINE | Facility: CLINIC | Age: 46
End: 2021-04-13

## 2021-04-14 ENCOUNTER — PATIENT MESSAGE (OUTPATIENT)
Dept: FAMILY MEDICINE | Facility: CLINIC | Age: 46
End: 2021-04-14

## 2021-05-14 ENCOUNTER — LAB VISIT (OUTPATIENT)
Dept: LAB | Facility: HOSPITAL | Age: 46
End: 2021-05-14
Attending: INTERNAL MEDICINE
Payer: COMMERCIAL

## 2021-05-14 DIAGNOSIS — E88.819 INSULIN RESISTANCE: ICD-10-CM

## 2021-05-14 DIAGNOSIS — I10 ESSENTIAL HYPERTENSION: ICD-10-CM

## 2021-05-14 DIAGNOSIS — Z11.59 ENCOUNTER FOR HEPATITIS C SCREENING TEST FOR LOW RISK PATIENT: ICD-10-CM

## 2021-05-14 LAB
ALBUMIN SERPL BCP-MCNC: 3.8 G/DL (ref 3.5–5.2)
ALP SERPL-CCNC: 74 U/L (ref 55–135)
ALT SERPL W/O P-5'-P-CCNC: 11 U/L (ref 10–44)
ANION GAP SERPL CALC-SCNC: 10 MMOL/L (ref 8–16)
AST SERPL-CCNC: 12 U/L (ref 10–40)
BASOPHILS # BLD AUTO: 0.06 K/UL (ref 0–0.2)
BASOPHILS NFR BLD: 0.6 % (ref 0–1.9)
BILIRUB SERPL-MCNC: 0.4 MG/DL (ref 0.1–1)
BUN SERPL-MCNC: 19 MG/DL (ref 6–20)
CALCIUM SERPL-MCNC: 10 MG/DL (ref 8.7–10.5)
CHLORIDE SERPL-SCNC: 101 MMOL/L (ref 95–110)
CHOLEST SERPL-MCNC: 229 MG/DL (ref 120–199)
CHOLEST/HDLC SERPL: 5.1 {RATIO} (ref 2–5)
CO2 SERPL-SCNC: 27 MMOL/L (ref 23–29)
CREAT SERPL-MCNC: 1.6 MG/DL (ref 0.5–1.4)
DIFFERENTIAL METHOD: ABNORMAL
EOSINOPHIL # BLD AUTO: 0.2 K/UL (ref 0–0.5)
EOSINOPHIL NFR BLD: 1.8 % (ref 0–8)
ERYTHROCYTE [DISTWIDTH] IN BLOOD BY AUTOMATED COUNT: 13.6 % (ref 11.5–14.5)
EST. GFR  (AFRICAN AMERICAN): 44 ML/MIN/1.73 M^2
EST. GFR  (NON AFRICAN AMERICAN): 38 ML/MIN/1.73 M^2
ESTIMATED AVG GLUCOSE: 134 MG/DL (ref 68–131)
GLUCOSE SERPL-MCNC: 152 MG/DL (ref 70–110)
HBA1C MFR BLD: 6.3 % (ref 4–5.6)
HCT VFR BLD AUTO: 35.4 % (ref 37–48.5)
HDLC SERPL-MCNC: 45 MG/DL (ref 40–75)
HDLC SERPL: 19.7 % (ref 20–50)
HGB BLD-MCNC: 11.4 G/DL (ref 12–16)
IMM GRANULOCYTES # BLD AUTO: 0.03 K/UL (ref 0–0.04)
IMM GRANULOCYTES NFR BLD AUTO: 0.3 % (ref 0–0.5)
LDLC SERPL CALC-MCNC: ABNORMAL MG/DL (ref 63–159)
LYMPHOCYTES # BLD AUTO: 2.2 K/UL (ref 1–4.8)
LYMPHOCYTES NFR BLD: 23.1 % (ref 18–48)
MCH RBC QN AUTO: 28.9 PG (ref 27–31)
MCHC RBC AUTO-ENTMCNC: 32.2 G/DL (ref 32–36)
MCV RBC AUTO: 90 FL (ref 82–98)
MONOCYTES # BLD AUTO: 0.6 K/UL (ref 0.3–1)
MONOCYTES NFR BLD: 6 % (ref 4–15)
NEUTROPHILS # BLD AUTO: 6.6 K/UL (ref 1.8–7.7)
NEUTROPHILS NFR BLD: 68.2 % (ref 38–73)
NONHDLC SERPL-MCNC: 184 MG/DL
NRBC BLD-RTO: 0 /100 WBC
PLATELET # BLD AUTO: 338 K/UL (ref 150–450)
PMV BLD AUTO: 11.2 FL (ref 9.2–12.9)
POTASSIUM SERPL-SCNC: 3.8 MMOL/L (ref 3.5–5.1)
PROT SERPL-MCNC: 7.6 G/DL (ref 6–8.4)
RBC # BLD AUTO: 3.95 M/UL (ref 4–5.4)
SODIUM SERPL-SCNC: 138 MMOL/L (ref 136–145)
TRIGL SERPL-MCNC: 407 MG/DL (ref 30–150)
TSH SERPL DL<=0.005 MIU/L-ACNC: 2.61 UIU/ML (ref 0.4–4)
WBC # BLD AUTO: 9.71 K/UL (ref 3.9–12.7)

## 2021-05-14 PROCEDURE — 84443 ASSAY THYROID STIM HORMONE: CPT | Performed by: INTERNAL MEDICINE

## 2021-05-14 PROCEDURE — 83036 HEMOGLOBIN GLYCOSYLATED A1C: CPT | Performed by: INTERNAL MEDICINE

## 2021-05-14 PROCEDURE — 86803 HEPATITIS C AB TEST: CPT | Performed by: INTERNAL MEDICINE

## 2021-05-14 PROCEDURE — 80053 COMPREHEN METABOLIC PANEL: CPT | Performed by: INTERNAL MEDICINE

## 2021-05-14 PROCEDURE — 85025 COMPLETE CBC W/AUTO DIFF WBC: CPT | Performed by: INTERNAL MEDICINE

## 2021-05-14 PROCEDURE — 36415 COLL VENOUS BLD VENIPUNCTURE: CPT | Performed by: INTERNAL MEDICINE

## 2021-05-14 PROCEDURE — 80061 LIPID PANEL: CPT | Performed by: INTERNAL MEDICINE

## 2021-05-17 LAB — HCV AB SERPL QL IA: NEGATIVE

## 2021-05-23 ENCOUNTER — PATIENT MESSAGE (OUTPATIENT)
Dept: FAMILY MEDICINE | Facility: CLINIC | Age: 46
End: 2021-05-23

## 2021-05-23 DIAGNOSIS — D50.9 IRON DEFICIENCY ANEMIA, UNSPECIFIED IRON DEFICIENCY ANEMIA TYPE: ICD-10-CM

## 2021-05-23 DIAGNOSIS — N18.31 STAGE 3A CHRONIC KIDNEY DISEASE: ICD-10-CM

## 2021-05-23 DIAGNOSIS — E88.819 INSULIN RESISTANCE: ICD-10-CM

## 2021-05-23 DIAGNOSIS — I10 ESSENTIAL HYPERTENSION: Primary | ICD-10-CM

## 2021-05-23 RX ORDER — ROSUVASTATIN CALCIUM 20 MG/1
20 TABLET, COATED ORAL DAILY
Qty: 90 TABLET | Refills: 3 | Status: SHIPPED | OUTPATIENT
Start: 2021-05-23 | End: 2022-03-12

## 2021-07-03 DIAGNOSIS — F41.1 GAD (GENERALIZED ANXIETY DISORDER): ICD-10-CM

## 2021-07-03 RX ORDER — CITALOPRAM 20 MG/1
20 TABLET, FILM COATED ORAL DAILY
Qty: 90 TABLET | Refills: 3 | Status: CANCELLED | OUTPATIENT
Start: 2021-07-03 | End: 2022-07-03

## 2021-07-21 DIAGNOSIS — K21.9 GASTROESOPHAGEAL REFLUX DISEASE WITHOUT ESOPHAGITIS: ICD-10-CM

## 2021-07-21 RX ORDER — PANTOPRAZOLE SODIUM 40 MG/1
40 TABLET, DELAYED RELEASE ORAL DAILY
Qty: 90 TABLET | Refills: 3 | Status: SHIPPED | OUTPATIENT
Start: 2021-07-21 | End: 2023-02-06 | Stop reason: SDUPTHER

## 2021-08-02 RX ORDER — LABETALOL 100 MG/1
100 TABLET, FILM COATED ORAL 2 TIMES DAILY
Qty: 180 TABLET | Refills: 2 | Status: SHIPPED | OUTPATIENT
Start: 2021-08-02 | End: 2022-01-28

## 2021-11-12 ENCOUNTER — PATIENT MESSAGE (OUTPATIENT)
Dept: ADMINISTRATIVE | Facility: HOSPITAL | Age: 46
End: 2021-11-12
Payer: COMMERCIAL

## 2021-11-12 ENCOUNTER — PATIENT OUTREACH (OUTPATIENT)
Dept: ADMINISTRATIVE | Facility: HOSPITAL | Age: 46
End: 2021-11-12
Payer: COMMERCIAL

## 2022-01-28 ENCOUNTER — OFFICE VISIT (OUTPATIENT)
Dept: FAMILY MEDICINE | Facility: CLINIC | Age: 47
End: 2022-01-28
Payer: COMMERCIAL

## 2022-01-28 VITALS
HEIGHT: 64 IN | HEART RATE: 75 BPM | BODY MASS INDEX: 32.43 KG/M2 | SYSTOLIC BLOOD PRESSURE: 118 MMHG | OXYGEN SATURATION: 99 % | WEIGHT: 189.94 LBS | DIASTOLIC BLOOD PRESSURE: 82 MMHG

## 2022-01-28 DIAGNOSIS — N18.31 STAGE 3A CHRONIC KIDNEY DISEASE: ICD-10-CM

## 2022-01-28 DIAGNOSIS — E88.819 INSULIN RESISTANCE: ICD-10-CM

## 2022-01-28 DIAGNOSIS — Q61.3 POLYCYSTIC KIDNEY DISEASE: ICD-10-CM

## 2022-01-28 DIAGNOSIS — K21.9 GASTROESOPHAGEAL REFLUX DISEASE WITHOUT ESOPHAGITIS: ICD-10-CM

## 2022-01-28 DIAGNOSIS — Z12.31 ENCOUNTER FOR SCREENING MAMMOGRAM FOR MALIGNANT NEOPLASM OF BREAST: ICD-10-CM

## 2022-01-28 DIAGNOSIS — E78.5 HYPERLIPIDEMIA, UNSPECIFIED HYPERLIPIDEMIA TYPE: ICD-10-CM

## 2022-01-28 DIAGNOSIS — I10 ESSENTIAL HYPERTENSION: ICD-10-CM

## 2022-01-28 DIAGNOSIS — F41.1 GAD (GENERALIZED ANXIETY DISORDER): ICD-10-CM

## 2022-01-28 DIAGNOSIS — Z90.3 HISTORY OF SLEEVE GASTRECTOMY: ICD-10-CM

## 2022-01-28 DIAGNOSIS — F32.0 MAJOR DEPRESSIVE DISORDER, SINGLE EPISODE, MILD: ICD-10-CM

## 2022-01-28 DIAGNOSIS — D50.9 IRON DEFICIENCY ANEMIA, UNSPECIFIED IRON DEFICIENCY ANEMIA TYPE: ICD-10-CM

## 2022-01-28 DIAGNOSIS — Z12.11 SCREEN FOR COLON CANCER: ICD-10-CM

## 2022-01-28 DIAGNOSIS — Z00.00 WELL ADULT EXAM: Primary | ICD-10-CM

## 2022-01-28 DIAGNOSIS — Z23 NEED FOR IMMUNIZATION AGAINST INFLUENZA: ICD-10-CM

## 2022-01-28 DIAGNOSIS — Z12.4 SCREENING FOR CERVICAL CANCER: ICD-10-CM

## 2022-01-28 DIAGNOSIS — F41.0 PANIC ATTACK: ICD-10-CM

## 2022-01-28 PROCEDURE — 1160F PR REVIEW ALL MEDS BY PRESCRIBER/CLIN PHARMACIST DOCUMENTED: ICD-10-PCS | Mod: CPTII,S$GLB,, | Performed by: INTERNAL MEDICINE

## 2022-01-28 PROCEDURE — 90686 IIV4 VACC NO PRSV 0.5 ML IM: CPT | Mod: S$GLB,,, | Performed by: INTERNAL MEDICINE

## 2022-01-28 PROCEDURE — 3079F PR MOST RECENT DIASTOLIC BLOOD PRESSURE 80-89 MM HG: ICD-10-PCS | Mod: CPTII,S$GLB,, | Performed by: INTERNAL MEDICINE

## 2022-01-28 PROCEDURE — 1159F MED LIST DOCD IN RCRD: CPT | Mod: CPTII,S$GLB,, | Performed by: INTERNAL MEDICINE

## 2022-01-28 PROCEDURE — 99396 PREV VISIT EST AGE 40-64: CPT | Mod: 25,S$GLB,, | Performed by: INTERNAL MEDICINE

## 2022-01-28 PROCEDURE — 90471 FLU VACCINE (QUAD) GREATER THAN OR EQUAL TO 3YO PRESERVATIVE FREE IM: ICD-10-PCS | Mod: S$GLB,,, | Performed by: INTERNAL MEDICINE

## 2022-01-28 PROCEDURE — 3079F DIAST BP 80-89 MM HG: CPT | Mod: CPTII,S$GLB,, | Performed by: INTERNAL MEDICINE

## 2022-01-28 PROCEDURE — 3074F PR MOST RECENT SYSTOLIC BLOOD PRESSURE < 130 MM HG: ICD-10-PCS | Mod: CPTII,S$GLB,, | Performed by: INTERNAL MEDICINE

## 2022-01-28 PROCEDURE — 3008F BODY MASS INDEX DOCD: CPT | Mod: CPTII,S$GLB,, | Performed by: INTERNAL MEDICINE

## 2022-01-28 PROCEDURE — 3066F PR DOCUMENTATION OF TREATMENT FOR NEPHROPATHY: ICD-10-PCS | Mod: CPTII,S$GLB,, | Performed by: INTERNAL MEDICINE

## 2022-01-28 PROCEDURE — 82570 ASSAY OF URINE CREATININE: CPT | Performed by: INTERNAL MEDICINE

## 2022-01-28 PROCEDURE — 3061F PR NEG MICROALBUMINURIA RESULT DOCUMENTED/REVIEW: ICD-10-PCS | Mod: CPTII,S$GLB,, | Performed by: INTERNAL MEDICINE

## 2022-01-28 PROCEDURE — 3074F SYST BP LT 130 MM HG: CPT | Mod: CPTII,S$GLB,, | Performed by: INTERNAL MEDICINE

## 2022-01-28 PROCEDURE — 3008F PR BODY MASS INDEX (BMI) DOCUMENTED: ICD-10-PCS | Mod: CPTII,S$GLB,, | Performed by: INTERNAL MEDICINE

## 2022-01-28 PROCEDURE — 3066F NEPHROPATHY DOC TX: CPT | Mod: CPTII,S$GLB,, | Performed by: INTERNAL MEDICINE

## 2022-01-28 PROCEDURE — 90471 IMMUNIZATION ADMIN: CPT | Mod: S$GLB,,, | Performed by: INTERNAL MEDICINE

## 2022-01-28 PROCEDURE — 1159F PR MEDICATION LIST DOCUMENTED IN MEDICAL RECORD: ICD-10-PCS | Mod: CPTII,S$GLB,, | Performed by: INTERNAL MEDICINE

## 2022-01-28 PROCEDURE — 3061F NEG MICROALBUMINURIA REV: CPT | Mod: CPTII,S$GLB,, | Performed by: INTERNAL MEDICINE

## 2022-01-28 PROCEDURE — 90686 FLU VACCINE (QUAD) GREATER THAN OR EQUAL TO 3YO PRESERVATIVE FREE IM: ICD-10-PCS | Mod: S$GLB,,, | Performed by: INTERNAL MEDICINE

## 2022-01-28 PROCEDURE — 1160F RVW MEDS BY RX/DR IN RCRD: CPT | Mod: CPTII,S$GLB,, | Performed by: INTERNAL MEDICINE

## 2022-01-28 PROCEDURE — 99396 PR PREVENTIVE VISIT,EST,40-64: ICD-10-PCS | Mod: 25,S$GLB,, | Performed by: INTERNAL MEDICINE

## 2022-01-28 NOTE — PROGRESS NOTES
Subjective:       Patient ID: Anyi Moore is a 46 y.o. female.    Medication List with Changes/Refills   Current Medications    CITALOPRAM (CELEXA) 20 MG TABLET    Take 1 tablet by mouth once daily    HYDROCHLOROTHIAZIDE (HYDRODIURIL) 25 MG TABLET    Take 1 tablet (25 mg total) by mouth once daily.    MULTIVITAMIN (THERAGRAN) PER TABLET    Take 1 tablet by mouth once daily.    NORGESTIMATE-ETHINYL ESTRADIOL (ORTHO TRI-CYCLEN LO) 0.18/0.215/0.25 MG-25 MCG TABLET    Take 1 tablet by mouth once daily.    OLMESARTAN (BENICAR) 40 MG TABLET    Take 1 tablet (40 mg total) by mouth once daily.    PANTOPRAZOLE (PROTONIX) 40 MG TABLET    Take 1 tablet (40 mg total) by mouth once daily.    ROSUVASTATIN (CRESTOR) 20 MG TABLET    Take 1 tablet (20 mg total) by mouth once daily.   Discontinued Medications    ALPRAZOLAM (XANAX) 0.5 MG TABLET    Take 1 tablet (0.5 mg total) by mouth daily as needed for Anxiety. Panic attacks    ASPIRIN (ECOTRIN) 81 MG EC TABLET    Take 1 tablet (81 mg total) by mouth once daily.    DIPHENHYDRAMINE (BENADRYL) 25 MG CAPSULE    Take 1 each (25 mg total) by mouth every 6 (six) hours as needed for Itching.    FISH OIL-OMEGA-3 FATTY ACIDS 300-1,000 MG CAPSULE    Take 2 g by mouth once daily.    LABETALOL (NORMODYNE) 100 MG TABLET    Take 1 tablet (100 mg total) by mouth 2 (two) times daily.    METFORMIN (GLUCOPHAGE) 500 MG TABLET    Take 1 tablet (500 mg total) by mouth daily with breakfast.    TIZANIDINE (ZANAFLEX) 2 MG TABLET    Take 2 mg by mouth every 6 (six) hours as needed.    VALACYCLOVIR (VALTREX) 1000 MG TABLET    Take 1 tablet by mouth twice daily for 7 days       Chief Complaint: Annual Exam (Medication changes)  She is here today to f/u on chronic medical issues.     On 9/2021 she had a gastric sleeve and has lost over 80 lbs.  She is exercising regularly and is complaint with the food restrictions after surgery.       She has hypertension due to PCKD. She has been on treatment for  HTN for some time and is taking olmesartan 40 mg qday, HCTZ 25 mg qday and labetolol 100 mg bid. She does report her home BP have been very low with SBP in the 90s and associated with lightheadedness.   Her PCKD was dx at age 32. She has had u/s in the past and is followed by Dr. Alvarez. She denies hematuria. Her MGM has PCKD and daughter recently diagnosed wit PCKD.  Her creatinine was 1.6 on with GFR of 38 on 5/2021.      She has mild hyperlipidemia and is taking crestor 20 mg daily. Lipids on 5/2021 were 229/407/45/>400 (her dose of crestor was increased from 10 to 20 mg. She has not had her lipids rechecked). She is on aspirin daily.      She has insulin resistance with HbA1c of 6.3 on 5/2021. She has stopped metformin after the weight loss and her home glucose run less than 100. Her microalbumin was positive on 2/2021.       She has DWAIN due to heavy frequent periods. This has greatly improved since starting BCP. Her last H+H was 11.4/35 on 5/2021.       She has depression and PTSD and anxiety. She is currently taking celexa 20 mg daily and doing well. 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 She denies any panic attacks. She is sleeping well. No suicidal ideations.  She has xanax at home for panic attacks which she has not needed to use. Last fill was #20 on 3/31/2020.        She is with her  and children.  She is now self employed and refurbishes furniture. She is exercising and eating healthy. She has lost over 80 lbs since her gastric sleeve.     Colon cancer screen---none    Pap---6/2016 neg  Mammogram ---6/2019 neg  Tdap----7/2016  Pneumovax---3/2018  Influenza vaccine----refused   Covid vaccine----UTD x 3 doses     Review of Systems   Constitutional: Negative for appetite change, fatigue, fever and unexpected weight change.   HENT: Negative for congestion, ear pain, hearing loss, sore throat and trouble swallowing.    Eyes: Negative for pain and visual  "disturbance.   Respiratory: Negative for cough, chest tightness, shortness of breath and wheezing.    Cardiovascular: Negative for chest pain, palpitations and leg swelling.   Gastrointestinal: Negative for abdominal pain, blood in stool, constipation, diarrhea, nausea and vomiting.   Endocrine: Negative for polyuria.   Genitourinary: Negative for dysuria and hematuria.   Musculoskeletal: Negative for arthralgias, back pain and myalgias.   Skin: Negative for rash.   Neurological: Negative for dizziness, weakness, numbness and headaches.   Hematological: Does not bruise/bleed easily.   Psychiatric/Behavioral: Negative for dysphoric mood, sleep disturbance and suicidal ideas. The patient is not nervous/anxious.        Objective:      Vitals:    01/28/22 0952   BP: 118/82   Pulse: 75   SpO2: 99%   Weight: 86.1 kg (189 lb 14.8 oz)   Height: 5' 4" (1.626 m)     Body mass index is 32.6 kg/m².  Physical Exam    General appearance: No acute distress, cooperative  Eyes: PERRL, EOMI, conjunctiva clear  Ears: normal external ear and pinna, tm clear without drainage, canals clear  Nose: Normal mucosa without drainage  Throat: no exudates or erythema, tonsils not enlarged  Mouth: no sores or lesions, moist mucous membranes  Neck: FROM, soft, supple, no thyromegaly, no bruits  Lymph: no anterior or posterior cervical adenopathy  Heart::  Regular rate and rhythm, no murmur  Lung: Clear to ascultation bilaterally, no wheezing, no rales, no rhonchi, no distress  Abdomen: Soft, nontender, no distention, no hepatosplenomegaly, bowel sounds normal, no guarding, no rebound, no peritoneal signs  Skin: no rashes, no lesions  Extremities: no edema, no cyanosis  Neuro: CN 2-12 intact, 5/5 muscle strength upper and lower extremity bilaterally, 2+ DTRs UE and LE bilaterally, normal gait  Peripheral pulses: 2+ pedal pulses bilaterally, good perfusion and color  Musculoskeletal: FROM, good strenth, no tenderness  Joint: normal appearance, no " swelling, no warmth, no deformity in all joints    Assessment:       1. Well adult exam    2. Essential hypertension    3. Hyperlipidemia, unspecified hyperlipidemia type    4. Polycystic kidney disease    5. Stage 3a chronic kidney disease    6. Insulin resistance    7. Gastroesophageal reflux disease without esophagitis    8. Iron deficiency anemia, unspecified iron deficiency anemia type    9. History of sleeve gastrectomy    10. Major depressive disorder, single episode, mild    11. LORETA (generalized anxiety disorder)    12. Panic attack    13. Screen for colon cancer    14. Encounter for screening mammogram for malignant neoplasm of breast    15. Screening for cervical cancer    16. Need for immunization against influenza        Plan:       Well adult exam  She is due for fecal kit, mammogram and pap.  Labs due.  Given influenza vaccine today. She is UTD with covid vaccine.     Essential hypertension  BP too low since weight loss. Will start labetalol.  She will continue to check BP at home and consider stopping or reducing HCTZ next if needed.   -     CBC Auto Differential; Future; Expected date: 01/28/2022  -     Comprehensive Metabolic Panel; Future; Expected date: 01/28/2022  -     Lipid Panel; Future; Expected date: 01/28/2022  -     TSH; Future; Expected date: 01/28/2022    Hyperlipidemia, unspecified hyperlipidemia type  Due to recheck lipids on crestor 20 mg daily.     Polycystic kidney disease  She is due to repeat u/s and f/u with nephrology.   -     US Retroperitoneal Complete (Kidney and; Future; Expected date: 01/28/2022    Stage 3a chronic kidney disease  Due for labs to check stability.   -     Microalbumin/Creatinine Ratio, Urine    Insulin resistance  She is off metformin and home glucose look good. Will check HbA1c today.   -     Hemoglobin A1C; Future; Expected date: 01/28/2022    Gastroesophageal reflux disease without esophagitis  Well controlled and continue current regimen.     Iron  deficiency anemia, unspecified iron deficiency anemia type  Due to recheck on BCP.    History of sleeve gastrectomy  She has done great with weight loss and no complications.     Major depressive disorder, single episode, mild  Good control on celexa.     LORETA (generalized anxiety disorder) with Panic attack  No recent panic attacks and she is doing well. She is not using xanax.    Screen for colon cancer  -     Fecal Immunochemical Test (iFOBT); Future; Expected date: 01/28/2022    Encounter for screening mammogram for malignant neoplasm of breast  -     Mammo Digital Screening Bilat w/ Sagar; Future; Expected date: 01/28/2022    Screening for cervical cancer  -     Ambulatory referral/consult to Obstetrics / Gynecology; Future; Expected date: 02/04/2022    Need for immunization against influenza  -     Influenza - Quadrivalent (PF)    Follow up in about 6 months (around 7/28/2022) for chronic medical issues.

## 2022-01-29 LAB
ALBUMIN/CREAT UR: 26.7 UG/MG (ref 0–30)
CREAT UR-MCNC: 90 MG/DL (ref 15–325)
MICROALBUMIN UR DL<=1MG/L-MCNC: 24 UG/ML

## 2022-01-30 ENCOUNTER — PATIENT MESSAGE (OUTPATIENT)
Dept: FAMILY MEDICINE | Facility: CLINIC | Age: 47
End: 2022-01-30
Payer: COMMERCIAL

## 2022-02-14 ENCOUNTER — PATIENT OUTREACH (OUTPATIENT)
Dept: ADMINISTRATIVE | Facility: OTHER | Age: 47
End: 2022-02-14
Payer: COMMERCIAL

## 2022-02-14 NOTE — PROGRESS NOTES
Health Maintenance Due   Topic Date Due    Cervical Cancer Screening  07/06/2019    Colorectal Cancer Screening  Never done    Mammogram  06/28/2020     Updates were requested from care everywhere.  Chart was reviewed for overdue Proactive [facility] Encounters (JENNIF[facility]) topics (CRS, Breast Cancer Screening, Eye exam)  Health Maintenance has been updated.  LINKS immunization registry triggered.  Immunizations were reconciled.

## 2022-02-16 ENCOUNTER — LAB VISIT (OUTPATIENT)
Dept: LAB | Facility: HOSPITAL | Age: 47
End: 2022-02-16
Attending: INTERNAL MEDICINE
Payer: COMMERCIAL

## 2022-02-16 DIAGNOSIS — Z12.11 SCREEN FOR COLON CANCER: ICD-10-CM

## 2022-02-16 PROCEDURE — 82274 ASSAY TEST FOR BLOOD FECAL: CPT | Performed by: INTERNAL MEDICINE

## 2022-02-24 LAB — HEMOCCULT STL QL IA: NEGATIVE

## 2022-02-28 ENCOUNTER — PATIENT MESSAGE (OUTPATIENT)
Dept: FAMILY MEDICINE | Facility: CLINIC | Age: 47
End: 2022-02-28
Payer: COMMERCIAL

## 2022-02-28 DIAGNOSIS — I10 ESSENTIAL HYPERTENSION: ICD-10-CM

## 2022-02-28 NOTE — TELEPHONE ENCOUNTER
Care Due:                  Date            Visit Type   Department     Provider  --------------------------------------------------------------------------------                                MYCHART                              FOLLOWUP/OF  [facility]  Last Visit: 01-      [facility] VISIT   MEDICINE       Nae Valdez                              EP -                              PRIMARY      [facility]  Next Visit: 08-      CARE ([facility])   MEDICINE       Nae Valdez                                                            Last  Test          Frequency    Reason                     Performed    Due Date  --------------------------------------------------------------------------------    CMP.........  12 months..  hydroCHLOROthiazide,       05- 05-                             rosuvastatin.............    Lipid Panel.  12 months..  rosuvastatin.............  05- 05-    Powered by [employer]. Reference number: [id number].   2/28/2022 9:35:53 AM CST

## 2022-03-01 ENCOUNTER — PATIENT MESSAGE (OUTPATIENT)
Dept: FAMILY MEDICINE | Facility: CLINIC | Age: 47
End: 2022-03-01
Payer: COMMERCIAL

## 2022-03-02 RX ORDER — HYDROCHLOROTHIAZIDE 25 MG/1
TABLET ORAL
Qty: 90 TABLET | Refills: 3 | Status: SHIPPED | OUTPATIENT
Start: 2022-03-02 | End: 2023-04-29 | Stop reason: SDUPTHER

## 2022-03-04 ENCOUNTER — LAB VISIT (OUTPATIENT)
Dept: LAB | Facility: CLINIC | Age: 47
End: 2022-03-04
Payer: COMMERCIAL

## 2022-03-04 DIAGNOSIS — E88.819 INSULIN RESISTANCE: ICD-10-CM

## 2022-03-04 DIAGNOSIS — I10 ESSENTIAL HYPERTENSION: ICD-10-CM

## 2022-03-04 LAB
ALBUMIN SERPL BCP-MCNC: 3.6 G/DL (ref 3.5–5.2)
ALP SERPL-CCNC: 68 U/L (ref 55–135)
ALT SERPL W/O P-5'-P-CCNC: 13 U/L (ref 10–44)
ANION GAP SERPL CALC-SCNC: 12 MMOL/L (ref 8–16)
AST SERPL-CCNC: 14 U/L (ref 10–40)
BASOPHILS # BLD AUTO: 0.07 K/UL (ref 0–0.2)
BASOPHILS NFR BLD: 0.8 % (ref 0–1.9)
BILIRUB SERPL-MCNC: 0.6 MG/DL (ref 0.1–1)
BUN SERPL-MCNC: 21 MG/DL (ref 6–20)
CALCIUM SERPL-MCNC: 10 MG/DL (ref 8.7–10.5)
CHLORIDE SERPL-SCNC: 101 MMOL/L (ref 95–110)
CHOLEST SERPL-MCNC: 236 MG/DL (ref 120–199)
CHOLEST/HDLC SERPL: 5.8 {RATIO} (ref 2–5)
CO2 SERPL-SCNC: 27 MMOL/L (ref 23–29)
CREAT SERPL-MCNC: 1.5 MG/DL (ref 0.5–1.4)
DIFFERENTIAL METHOD: ABNORMAL
EOSINOPHIL # BLD AUTO: 0.1 K/UL (ref 0–0.5)
EOSINOPHIL NFR BLD: 1.5 % (ref 0–8)
ERYTHROCYTE [DISTWIDTH] IN BLOOD BY AUTOMATED COUNT: 14.1 % (ref 11.5–14.5)
EST. GFR  (AFRICAN AMERICAN): 48 ML/MIN/1.73 M^2
EST. GFR  (NON AFRICAN AMERICAN): 41 ML/MIN/1.73 M^2
ESTIMATED AVG GLUCOSE: 117 MG/DL (ref 68–131)
GLUCOSE SERPL-MCNC: 100 MG/DL (ref 70–110)
HBA1C MFR BLD: 5.7 % (ref 4–5.6)
HCT VFR BLD AUTO: 39.6 % (ref 37–48.5)
HDLC SERPL-MCNC: 41 MG/DL (ref 40–75)
HDLC SERPL: 17.4 % (ref 20–50)
HGB BLD-MCNC: 12 G/DL (ref 12–16)
IMM GRANULOCYTES # BLD AUTO: 0.01 K/UL (ref 0–0.04)
IMM GRANULOCYTES NFR BLD AUTO: 0.1 % (ref 0–0.5)
LDLC SERPL CALC-MCNC: 137.4 MG/DL (ref 63–159)
LYMPHOCYTES # BLD AUTO: 3.1 K/UL (ref 1–4.8)
LYMPHOCYTES NFR BLD: 33.7 % (ref 18–48)
MCH RBC QN AUTO: 28 PG (ref 27–31)
MCHC RBC AUTO-ENTMCNC: 30.3 G/DL (ref 32–36)
MCV RBC AUTO: 93 FL (ref 82–98)
MONOCYTES # BLD AUTO: 0.5 K/UL (ref 0.3–1)
MONOCYTES NFR BLD: 5.2 % (ref 4–15)
NEUTROPHILS # BLD AUTO: 5.3 K/UL (ref 1.8–7.7)
NEUTROPHILS NFR BLD: 58.7 % (ref 38–73)
NONHDLC SERPL-MCNC: 195 MG/DL
NRBC BLD-RTO: 0 /100 WBC
PLATELET # BLD AUTO: 335 K/UL (ref 150–450)
PMV BLD AUTO: 11.8 FL (ref 9.2–12.9)
POTASSIUM SERPL-SCNC: 3.9 MMOL/L (ref 3.5–5.1)
PROT SERPL-MCNC: 7.6 G/DL (ref 6–8.4)
RBC # BLD AUTO: 4.28 M/UL (ref 4–5.4)
SODIUM SERPL-SCNC: 140 MMOL/L (ref 136–145)
TRIGL SERPL-MCNC: 288 MG/DL (ref 30–150)
TSH SERPL DL<=0.005 MIU/L-ACNC: 2.5 UIU/ML (ref 0.4–4)
WBC # BLD AUTO: 9.09 K/UL (ref 3.9–12.7)

## 2022-03-04 PROCEDURE — 85025 COMPLETE CBC W/AUTO DIFF WBC: CPT | Performed by: INTERNAL MEDICINE

## 2022-03-04 PROCEDURE — 84443 ASSAY THYROID STIM HORMONE: CPT | Performed by: INTERNAL MEDICINE

## 2022-03-04 PROCEDURE — 80053 COMPREHEN METABOLIC PANEL: CPT | Performed by: INTERNAL MEDICINE

## 2022-03-04 PROCEDURE — 80061 LIPID PANEL: CPT | Performed by: INTERNAL MEDICINE

## 2022-03-04 PROCEDURE — 83036 HEMOGLOBIN GLYCOSYLATED A1C: CPT | Performed by: INTERNAL MEDICINE

## 2022-03-12 ENCOUNTER — PATIENT MESSAGE (OUTPATIENT)
Dept: FAMILY MEDICINE | Facility: CLINIC | Age: 47
End: 2022-03-12
Payer: COMMERCIAL

## 2022-03-12 DIAGNOSIS — E88.819 INSULIN RESISTANCE: ICD-10-CM

## 2022-03-12 DIAGNOSIS — I10 ESSENTIAL HYPERTENSION: Primary | ICD-10-CM

## 2022-03-12 RX ORDER — ROSUVASTATIN CALCIUM 40 MG/1
40 TABLET, COATED ORAL NIGHTLY
Qty: 90 TABLET | Refills: 3 | Status: SHIPPED | OUTPATIENT
Start: 2022-03-12 | End: 2023-04-29 | Stop reason: SDUPTHER

## 2022-03-12 NOTE — TELEPHONE ENCOUNTER
See mychart message    Increased crestor to 40 mg daily    Please schedule repeat labs fasting before her appt in August on higher dose of crestor    Thanks

## 2022-05-01 DIAGNOSIS — Z30.011 ORAL CONTRACEPTION INITIAL PRESCRIPTION: ICD-10-CM

## 2022-05-01 NOTE — TELEPHONE ENCOUNTER
No new care gaps identified.  Powered by [email or web address]. Reference number: [id number].   5/01/2022 10:59:17 AM CDT

## 2022-05-02 NOTE — TELEPHONE ENCOUNTER
Refill Routing Note   Medication(s) are not appropriate for processing by [facility] for the following reason(s):      - Mammography due    [facility] action(s):  Defer          Medication reconciliation completed: No     Appointments  past 12m or future 3m with PCP    Date Provider   Last Visit   1/28/2022 Nae Valdez, DO   Next Visit   8/9/2022 Nae Valdez, DO   ED visits in past 90 days: 0        Note composed:2:40 PM 05/02/2022

## 2022-05-03 RX ORDER — NORGESTIMATE AND ETHINYL ESTRADIOL 7DAYSX3 LO
KIT ORAL
Qty: 84 TABLET | Refills: 3 | Status: SHIPPED | OUTPATIENT
Start: 2022-05-03 | End: 2023-02-06 | Stop reason: SDUPTHER

## 2022-05-30 DIAGNOSIS — I10 ESSENTIAL HYPERTENSION: ICD-10-CM

## 2022-05-30 RX ORDER — ROSUVASTATIN CALCIUM 20 MG/1
TABLET, COATED ORAL
Qty: 90 TABLET | Refills: 0 | OUTPATIENT
Start: 2022-05-30

## 2022-05-30 NOTE — TELEPHONE ENCOUNTER
No new care gaps identified.  [facility] Embedded Care Gaps. Reference number: [id number]. 5/30/2022   6:07:06 PM CDT

## 2022-05-30 NOTE — TELEPHONE ENCOUNTER
Refill Routing Note   Medication(s) are not appropriate for processing by [facility] for the following reason(s):      - Required laboratory values are abnormal    [facility] action(s):  Defer  Quick Discontinue       Medication Therapy Plan: Rosuvastatin increased to 40mg by PCP(3/12/22); Quick dc rosuvastatin; Defer olmesartan  Medication reconciliation completed: No     Appointments  past 12m or future 3m with PCP    Date Provider   Last Visit   1/28/2022 Nae Valdez, DO   Next Visit   8/9/2022 Nae Valdez, DO   ED visits in past 90 days: 0        Note composed:6:41 PM 05/30/2022

## 2022-05-31 RX ORDER — OLMESARTAN MEDOXOMIL 40 MG/1
TABLET ORAL
Qty: 90 TABLET | Refills: 3 | Status: SHIPPED | OUTPATIENT
Start: 2022-05-31 | End: 2023-04-29 | Stop reason: SDUPTHER

## 2022-07-15 ENCOUNTER — OFFICE VISIT (OUTPATIENT)
Dept: OBSTETRICS AND GYNECOLOGY | Facility: CLINIC | Age: 47
End: 2022-07-15
Payer: COMMERCIAL

## 2022-07-15 ENCOUNTER — HOSPITAL ENCOUNTER (OUTPATIENT)
Dept: RADIOLOGY | Facility: HOSPITAL | Age: 47
Discharge: HOME | End: 2022-07-15
Attending: OBSTETRICS & GYNECOLOGY
Payer: COMMERCIAL

## 2022-07-15 VITALS — WEIGHT: 179.88 LBS | HEIGHT: 64 IN | BODY MASS INDEX: 30.71 KG/M2

## 2022-07-15 DIAGNOSIS — Z12.31 BREAST CANCER SCREENING BY MAMMOGRAM: ICD-10-CM

## 2022-07-15 DIAGNOSIS — N84.1 ENDOCERVICAL POLYP: ICD-10-CM

## 2022-07-15 DIAGNOSIS — Z01.419 ENCOUNTER FOR GYNECOLOGICAL EXAMINATION (GENERAL) (ROUTINE) WITHOUT ABNORMAL FINDINGS: Primary | ICD-10-CM

## 2022-07-15 DIAGNOSIS — Z12.4 CERVICAL CANCER SCREENING: ICD-10-CM

## 2022-07-15 PROCEDURE — 77067 MAMMO DIGITAL SCREENING BILAT WITH TOMO: ICD-10-PCS | Mod: 26,,, | Performed by: RADIOLOGY

## 2022-07-15 PROCEDURE — 1159F PR MEDICATION LIST DOCUMENTED IN MEDICAL RECORD: ICD-10-PCS | Mod: CPTII,S$GLB,, | Performed by: OBSTETRICS & GYNECOLOGY

## 2022-07-15 PROCEDURE — 88305 TISSUE EXAM BY PATHOLOGIST: CPT | Mod: 26,,, | Performed by: PATHOLOGY

## 2022-07-15 PROCEDURE — 99386 PR PREVENTIVE VISIT,NEW,40-64: ICD-10-PCS | Mod: 25,,, | Performed by: OBSTETRICS & GYNECOLOGY

## 2022-07-15 PROCEDURE — 77067 SCR MAMMO BI INCL CAD: CPT | Mod: TC,PN

## 2022-07-15 PROCEDURE — 3061F PR NEG MICROALBUMINURIA RESULT DOCUMENTED/REVIEW: ICD-10-PCS | Mod: CPTII,S$GLB,, | Performed by: OBSTETRICS & GYNECOLOGY

## 2022-07-15 PROCEDURE — 87624 HPV HI-RISK TYP POOLED RSLT: CPT | Performed by: OBSTETRICS & GYNECOLOGY

## 2022-07-15 PROCEDURE — 3008F BODY MASS INDEX DOCD: CPT | Mod: CPTII,S$GLB,, | Performed by: OBSTETRICS & GYNECOLOGY

## 2022-07-15 PROCEDURE — 99386 PREV VISIT NEW AGE 40-64: CPT | Mod: 25,,, | Performed by: OBSTETRICS & GYNECOLOGY

## 2022-07-15 PROCEDURE — 4010F ACE/ARB THERAPY RXD/TAKEN: CPT | Mod: CPTII,S$GLB,, | Performed by: OBSTETRICS & GYNECOLOGY

## 2022-07-15 PROCEDURE — 57500 PR BIOPSY CERVIX, 1 OR MORE, OR EXCISION OF LESION: ICD-10-PCS | Mod: ,,, | Performed by: OBSTETRICS & GYNECOLOGY

## 2022-07-15 PROCEDURE — 88305 TISSUE EXAM BY PATHOLOGIST: ICD-10-PCS | Mod: 26,,, | Performed by: PATHOLOGY

## 2022-07-15 PROCEDURE — 99999 PR PBB SHADOW E&M-EST. PATIENT-LVL III: ICD-10-PCS | Mod: PBBFAC,,, | Performed by: OBSTETRICS & GYNECOLOGY

## 2022-07-15 PROCEDURE — 77063 BREAST TOMOSYNTHESIS BI: CPT | Mod: TC,PN

## 2022-07-15 PROCEDURE — 57500 BIOPSY OF CERVIX: CPT | Mod: ,,, | Performed by: OBSTETRICS & GYNECOLOGY

## 2022-07-15 PROCEDURE — 77063 BREAST TOMOSYNTHESIS BI: CPT | Mod: 26,,, | Performed by: RADIOLOGY

## 2022-07-15 PROCEDURE — 99999 PR PBB SHADOW E&M-EST. PATIENT-LVL III: CPT | Mod: PBBFAC,,, | Performed by: OBSTETRICS & GYNECOLOGY

## 2022-07-15 PROCEDURE — 3008F PR BODY MASS INDEX (BMI) DOCUMENTED: ICD-10-PCS | Mod: CPTII,S$GLB,, | Performed by: OBSTETRICS & GYNECOLOGY

## 2022-07-15 PROCEDURE — 3044F HG A1C LEVEL LT 7.0%: CPT | Mod: CPTII,S$GLB,, | Performed by: OBSTETRICS & GYNECOLOGY

## 2022-07-15 PROCEDURE — 77067 SCR MAMMO BI INCL CAD: CPT | Mod: 26,,, | Performed by: RADIOLOGY

## 2022-07-15 PROCEDURE — 4010F PR ACE/ARB THEARPY RXD/TAKEN: ICD-10-PCS | Mod: CPTII,S$GLB,, | Performed by: OBSTETRICS & GYNECOLOGY

## 2022-07-15 PROCEDURE — 77063 MAMMO DIGITAL SCREENING BILAT WITH TOMO: ICD-10-PCS | Mod: 26,,, | Performed by: RADIOLOGY

## 2022-07-15 PROCEDURE — 88305 TISSUE EXAM BY PATHOLOGIST: CPT | Performed by: PATHOLOGY

## 2022-07-15 PROCEDURE — 3044F PR MOST RECENT HEMOGLOBIN A1C LEVEL <7.0%: ICD-10-PCS | Mod: CPTII,S$GLB,, | Performed by: OBSTETRICS & GYNECOLOGY

## 2022-07-15 PROCEDURE — 1160F RVW MEDS BY RX/DR IN RCRD: CPT | Mod: CPTII,S$GLB,, | Performed by: OBSTETRICS & GYNECOLOGY

## 2022-07-15 PROCEDURE — 1160F PR REVIEW ALL MEDS BY PRESCRIBER/CLIN PHARMACIST DOCUMENTED: ICD-10-PCS | Mod: CPTII,S$GLB,, | Performed by: OBSTETRICS & GYNECOLOGY

## 2022-07-15 PROCEDURE — 1159F MED LIST DOCD IN RCRD: CPT | Mod: CPTII,S$GLB,, | Performed by: OBSTETRICS & GYNECOLOGY

## 2022-07-15 PROCEDURE — 88175 CYTOPATH C/V AUTO FLUID REDO: CPT | Performed by: OBSTETRICS & GYNECOLOGY

## 2022-07-15 PROCEDURE — 3061F NEG MICROALBUMINURIA REV: CPT | Mod: CPTII,S$GLB,, | Performed by: OBSTETRICS & GYNECOLOGY

## 2022-07-15 PROCEDURE — 3066F NEPHROPATHY DOC TX: CPT | Mod: CPTII,S$GLB,, | Performed by: OBSTETRICS & GYNECOLOGY

## 2022-07-15 PROCEDURE — 3066F PR DOCUMENTATION OF TREATMENT FOR NEPHROPATHY: ICD-10-PCS | Mod: CPTII,S$GLB,, | Performed by: OBSTETRICS & GYNECOLOGY

## 2022-07-15 NOTE — PROGRESS NOTES
Chief Complaint   Patient presents with    Well Woman     Has been awhile since last pap       History and Physical:  Anyi Moore is a 47 y.o. F who presents today for her routine annual GYN exam. The patient has no Gynecology complaints.     Annual:   Patient's last menstrual period was 06/29/2022 (approximate).  Contraception: COCP, does not need refill  Last Pap: 2016 NILM  History of abnormal pap: never  Last Mammogram: 2019 BIRADs 1  STD testing: declines  PCP: Nae Valdez,  orders routine screening labs  Immunization status: stated as current, but no records available. S/p COVID  Colonoscopy: none, does FOBT with PCP  Body mass index is 31.37 kg/m².    Allergies:   Review of patient's allergies indicates:   Allergen Reactions    Wellbutrin [bupropion hcl] Hives    Percocet [oxycodone-acetaminophen] Other (See Comments)     Causes confusion     Past Medical History:   Diagnosis Date    Depression     Hyperlipidemia     Hypertension     Insulin resistance     Lymphadenopathy 7/29/2016    Polycystic kidney     PTSD (post-traumatic stress disorder)      Past Surgical History:   Procedure Laterality Date    DILATION AND CURETTAGE OF UTERUS      x4    gastric Sleeve        MEDS:   Current Outpatient Medications on File Prior to Visit   Medication Sig Dispense Refill    citalopram (CELEXA) 20 MG tablet Take 1 tablet by mouth once daily 90 tablet 3    hydroCHLOROthiazide (HYDRODIURIL) 25 MG tablet Take 1 tablet by mouth once daily 90 tablet 3    multivitamin (THERAGRAN) per tablet Take 1 tablet by mouth once daily.      olmesartan (BENICAR) 40 MG tablet Take 1 tablet by mouth once daily 90 tablet 3    pantoprazole (PROTONIX) 40 MG tablet Take 1 tablet (40 mg total) by mouth once daily. 90 tablet 3    rosuvastatin (CRESTOR) 40 MG Tab Take 1 tablet (40 mg total) by mouth every evening. 90 tablet 3    TRI-LO-SPRINTEC 0.18/0.215/0.25 mg-25 mcg tablet Take 1 tablet by mouth once daily 84  tablet 3     No current facility-administered medications on file prior to visit.     OB History        2    Para   2    Term   1            AB        Living   1       SAB        IAB        Ectopic        Multiple        Live Births                   Social History     Socioeconomic History    Marital status:     Number of children: 1   Occupational History    Occupation: registration     Employer: [employer]   Tobacco Use    Smoking status: Never Smoker    Smokeless tobacco: Never Used   Substance and Sexual Activity    Alcohol use: Yes     Alcohol/week: 0.0 standard drinks     Comment: social    Drug use: No    Sexual activity: Yes     Partners: Male     Birth control/protection: OCP   Social History Narrative     at [employer]     Family History   Adopted: Yes   Problem Relation Age of Onset    Cancer Mother         lung    Early death Mother 46    Hypertension Mother     Kidney disease Mother         polycystic    No Known Problems Daughter     No Known Problems Father     No Known Problems Sister     No Known Problems Brother     No Known Problems Maternal Aunt     No Known Problems Maternal Uncle     No Known Problems Paternal Aunt     No Known Problems Paternal Uncle     Kidney disease Maternal Grandmother         polycystic    No Known Problems Maternal Grandfather     No Known Problems Paternal Grandmother     No Known Problems Paternal Grandfather     Breast cancer Neg Hx     Ovarian cancer Neg Hx        Past medical and surgical history reviewed.   I have reviewed the patient's medical history in detail and updated the computerized patient record.    Review of System:   General: no chills, fever, night sweats, weight gain or weight loss  Breasts: no new or changing breast lumps, nipple discharge or masses.  Gastrointestinal: no abdominal pain, change in bowel habits, or black or bloody stools  Genito-Urinary: no incontinence,  "urinary frequency/urgency or vulvar/vaginal symptoms, pelvic pain or abnormal vaginal bleeding.    Physical Exam:   Ht 5' 3.5" (1.613 m)   Wt 81.6 kg (179 lb 14.3 oz)   LMP 06/29/2022 (Approximate)   BMI 31.37 kg/m²   Constitutional: She appears alert and responsive. She appears well-developed, well-groomed, and well-nourished. No distress.  Breasts: are symmetrical.  Right breast exhibits no inverted nipple, no mass, no nipple discharge, no skin change and no tenderness.  Left breast exhibits no inverted nipple, no mass, no nipple discharge, no skin change and no tenderness.  Abdominal: Soft. She exhibits no distension, hernias or masses. There is no tenderness. No enlargement of liver edge or spleen.  There is no rebound and no guarding.   Genitourinary:    External rectal exam shows no thrombosed external hemorrhoids, no lesions.     Pelvic exam was performed with patient supine.   No labial fusion, and symmetrical.    There is no rash, lesion or injury on the right labia.   There is no rash, lesion or injury on the left labia.   No bleeding and no signs of injury around the vaginal introitus, urethral meatus is normal size and without prolapse or lesions, urethra well supported. The cervix is visualized with no discharge, lesions or friability.   No vaginal discharge found.    No significant Cystocele, Enterocele or rectocele, and cervix and uterus well supported.  Endocervical polpy noted and removed   Bimanual exam:   The urethra is normal to palpation and there are no palpable vaginal wall masses.   Uterus is not deviated, not enlarged, not fixed, normal shape and not tender.   Cervix exhibits no motion tenderness.    Right adnexum displays no mass or nodularity and no tenderness.   Left adnexum displays no mass or nodularity and no tenderness.    Assessment/Plan:   Encounter for gynecological examination (general) (routine) without abnormal findings    Breast cancer screening by mammogram  -     Mammo " Digital Screening [facility]; Future; Expected date: 07/15/2022    Endocervical polyp  -     Biopsy (Gynecological)  -     Specimen to Pathology Other (Polyp)    Cervical cancer screening  -     HPV DNA, High Risk with Reflex to Genotype 16,18  -     Pap Smear, Thin Prep      Follow up in 1 year.

## 2022-07-15 NOTE — PROCEDURES
Biopsy (Gynecological)    Date/Time: 7/15/2022 9:40 AM  Performed by: Asia Huynh MD  Authorized by: Asia Huynh MD     Consent Done?:  Yes (Written)   Patient was prepped and draped in the normal sterile fashion.  Local anesthesia used?: No      Biopsy Location:  Cervix (Enodcervical polyp)     Procedure:     Endocervical polyp noted at cervical os. Patient verbally consented for removal. Risks, benefits, and alternatives discussed.   Cervix prepped with betadine. Polyp grasped with ringed forceps. Gentally twisted and removed the polyp. Hemostasis achieved with silver nitrate. Polyp sent to pathology.

## 2022-07-21 LAB
CLINICAL INFO: NORMAL
CYTO CVX: NORMAL
CYTOLOGIST CVX/VAG CYTO: NORMAL
CYTOLOGIST CVX/VAG CYTO: NORMAL
CYTOLOGY CMNT CVX/VAG CYTO-IMP: NORMAL
CYTOLOGY PAP THIN PREP EXPLANATION: NORMAL
DATE OF PREVIOUS PAP: NO
DATE PREVIOUS BX: NO
GEN CATEG CVX/VAG CYTO-IMP: NORMAL
HPV I/H RISK 4 DNA CVX QL NAA+PROBE: NOT DETECTED
LMP START DATE: NORMAL
MICROORGANISM CVX/VAG CYTO: NORMAL
PATHOLOGIST CVX/VAG CYTO: NORMAL
SERVICE CMNT-IMP: NORMAL
SPECIMEN SOURCE CVX/VAG CYTO: NORMAL
STAT OF ADQ CVX/VAG CYTO-IMP: NORMAL

## 2022-07-22 LAB
FINAL PATHOLOGIC DIAGNOSIS: NORMAL
Lab: NORMAL

## 2022-08-15 ENCOUNTER — PATIENT MESSAGE (OUTPATIENT)
Dept: FAMILY MEDICINE | Facility: CLINIC | Age: 47
End: 2022-08-15
Payer: COMMERCIAL

## 2022-08-15 RX ORDER — VALACYCLOVIR HYDROCHLORIDE 1 G/1
1000 TABLET, FILM COATED ORAL 2 TIMES DAILY
Qty: 14 TABLET | Refills: 3 | Status: SHIPPED | OUTPATIENT
Start: 2022-08-15 | End: 2022-10-20 | Stop reason: SDUPTHER

## 2022-08-24 ENCOUNTER — PATIENT MESSAGE (OUTPATIENT)
Dept: FAMILY MEDICINE | Facility: CLINIC | Age: 47
End: 2022-08-24
Payer: COMMERCIAL

## 2022-08-24 NOTE — TELEPHONE ENCOUNTER
She needs evaluation.     Schedule to be seen by someone tomorrow or she can go to ER for quicker evaluation with imaging.     Thanks

## 2022-08-25 ENCOUNTER — OFFICE VISIT (OUTPATIENT)
Dept: FAMILY MEDICINE | Facility: CLINIC | Age: 47
End: 2022-08-25
Payer: COMMERCIAL

## 2022-08-25 VITALS
WEIGHT: 176 LBS | OXYGEN SATURATION: 99 % | HEART RATE: 74 BPM | HEIGHT: 64 IN | DIASTOLIC BLOOD PRESSURE: 84 MMHG | TEMPERATURE: 98 F | SYSTOLIC BLOOD PRESSURE: 116 MMHG | BODY MASS INDEX: 30.05 KG/M2

## 2022-08-25 DIAGNOSIS — E78.5 HYPERLIPIDEMIA, UNSPECIFIED HYPERLIPIDEMIA TYPE: ICD-10-CM

## 2022-08-25 DIAGNOSIS — N30.01 ACUTE CYSTITIS WITH HEMATURIA: ICD-10-CM

## 2022-08-25 DIAGNOSIS — Q61.3 POLYCYSTIC KIDNEY DISEASE: Primary | ICD-10-CM

## 2022-08-25 DIAGNOSIS — Z90.3 H/O GASTRIC SLEEVE: ICD-10-CM

## 2022-08-25 DIAGNOSIS — N18.31 STAGE 3A CHRONIC KIDNEY DISEASE: ICD-10-CM

## 2022-08-25 LAB
BILIRUB SERPL-MCNC: NEGATIVE MG/DL
BLOOD URINE, POC: ABNORMAL
CLARITY, POC UA: ABNORMAL
COLOR, POC UA: YELLOW
GLUCOSE UR QL STRIP: NORMAL
KETONES UR QL STRIP: NEGATIVE
LEUKOCYTE ESTERASE URINE, POC: ABNORMAL
NITRITE, POC UA: ABNORMAL
PH, POC UA: 5
PROTEIN, POC: ABNORMAL
SPECIFIC GRAVITY, POC UA: 1.01
UROBILINOGEN, POC UA: NORMAL

## 2022-08-25 PROCEDURE — 3008F PR BODY MASS INDEX (BMI) DOCUMENTED: ICD-10-PCS | Mod: S$GLB,,, | Performed by: INTERNAL MEDICINE

## 2022-08-25 PROCEDURE — 3079F DIAST BP 80-89 MM HG: CPT | Mod: S$GLB,,, | Performed by: INTERNAL MEDICINE

## 2022-08-25 PROCEDURE — 4010F PR ACE/ARB THEARPY RXD/TAKEN: ICD-10-PCS | Mod: S$GLB,,, | Performed by: INTERNAL MEDICINE

## 2022-08-25 PROCEDURE — 1159F PR MEDICATION LIST DOCUMENTED IN MEDICAL RECORD: ICD-10-PCS | Mod: S$GLB,,, | Performed by: INTERNAL MEDICINE

## 2022-08-25 PROCEDURE — 99213 PR OFFICE/OUTPT VISIT, EST, LEVL III, 20-29 MIN: ICD-10-PCS | Mod: S$GLB,,, | Performed by: INTERNAL MEDICINE

## 2022-08-25 PROCEDURE — 81002 URINALYSIS NONAUTO W/O SCOPE: CPT | Mod: S$GLB,,, | Performed by: INTERNAL MEDICINE

## 2022-08-25 PROCEDURE — 99213 OFFICE O/P EST LOW 20 MIN: CPT | Mod: S$GLB,,, | Performed by: INTERNAL MEDICINE

## 2022-08-25 PROCEDURE — 3066F NEPHROPATHY DOC TX: CPT | Mod: S$GLB,,, | Performed by: INTERNAL MEDICINE

## 2022-08-25 PROCEDURE — 81002 POCT URINE DIPSTICK WITHOUT MICROSCOPE: ICD-10-PCS | Mod: S$GLB,,, | Performed by: INTERNAL MEDICINE

## 2022-08-25 PROCEDURE — 3061F NEG MICROALBUMINURIA REV: CPT | Mod: S$GLB,,, | Performed by: INTERNAL MEDICINE

## 2022-08-25 PROCEDURE — 3044F PR MOST RECENT HEMOGLOBIN A1C LEVEL <7.0%: ICD-10-PCS | Mod: S$GLB,,, | Performed by: INTERNAL MEDICINE

## 2022-08-25 PROCEDURE — 3079F PR MOST RECENT DIASTOLIC BLOOD PRESSURE 80-89 MM HG: ICD-10-PCS | Mod: S$GLB,,, | Performed by: INTERNAL MEDICINE

## 2022-08-25 PROCEDURE — 3066F PR DOCUMENTATION OF TREATMENT FOR NEPHROPATHY: ICD-10-PCS | Mod: S$GLB,,, | Performed by: INTERNAL MEDICINE

## 2022-08-25 PROCEDURE — 4010F ACE/ARB THERAPY RXD/TAKEN: CPT | Mod: S$GLB,,, | Performed by: INTERNAL MEDICINE

## 2022-08-25 PROCEDURE — 3074F SYST BP LT 130 MM HG: CPT | Mod: S$GLB,,, | Performed by: INTERNAL MEDICINE

## 2022-08-25 PROCEDURE — 3044F HG A1C LEVEL LT 7.0%: CPT | Mod: S$GLB,,, | Performed by: INTERNAL MEDICINE

## 2022-08-25 PROCEDURE — 3008F BODY MASS INDEX DOCD: CPT | Mod: S$GLB,,, | Performed by: INTERNAL MEDICINE

## 2022-08-25 PROCEDURE — 3061F PR NEG MICROALBUMINURIA RESULT DOCUMENTED/REVIEW: ICD-10-PCS | Mod: S$GLB,,, | Performed by: INTERNAL MEDICINE

## 2022-08-25 PROCEDURE — 3074F PR MOST RECENT SYSTOLIC BLOOD PRESSURE < 130 MM HG: ICD-10-PCS | Mod: S$GLB,,, | Performed by: INTERNAL MEDICINE

## 2022-08-25 PROCEDURE — 1159F MED LIST DOCD IN RCRD: CPT | Mod: S$GLB,,, | Performed by: INTERNAL MEDICINE

## 2022-08-25 RX ORDER — CEFUROXIME AXETIL 250 MG/1
250 TABLET ORAL 2 TIMES DAILY
Qty: 10 TABLET | Refills: 0 | Status: SHIPPED | OUTPATIENT
Start: 2022-08-25 | End: 2022-08-30

## 2022-08-25 NOTE — PROGRESS NOTES
Subjective:       Patient ID: Anyi Moore is a 47 y.o. female.    Chief Complaint: Flank Pain (Pt states on Tues morning she began having flank pain. Pain level is detected as an level 8. History of PKD. No hematuria in urine that pt knows of. Had slight chills and then began sweating .) and Follow-up (Pt was advised from another facility (Dr. Valdez office) to be evaluated by a PCP ([facility]). )    Patient has a history of polycystic kidney disease diagnosed at the time of her pregnancy at 32 years of age.  She is being followed-up from the Nephrology standpoint by  and will continued to do so.  Medication list was reviewed and she is compliant to therapy.  The only thing that I would have to add is that may be she can have the HCTZ lowered and or discontinued and maybe consider a dihydropyridine calcium channel blocker if there was no concern with hyper filtration he discuss this with Dr. Palm at the next appointment.      She has lost 88 lb since her gastric sleeve surgery performed in [place] and is doing well from that standpoint.  Needless to say that will benefit her greatly and she already noticed improvement in the way her hips and knees feel and hopefully she will never develop diabetes.  She has impaired glucose tolerance at best hemoglobin A1c is to be repeated by her primary care provider Dr. Valdez.      As per the lab work here today she seems to be suffering from an uncomplicated urinary tract infection but the bothersome part is the fact that she has had some flank pain.  She is aware of the fact that she is not to take any sulfa medications and have included as an allergy so that she never receive same.  Fluoroquinolones are to be avoided at this time and she will be treated instead empirically with cefuroxime axetil 250 mg twice a day for 5 days.  She does have some microscopic hematuria according to the point of care dipstick test.  No urine culture has been sent at this  time.      I have reviewed her lab work from March 4, 2022 and have nothing else to add.      She is tolerating the statin well and is compliant to same.  Her LDL was 137 March 4, 2022 with an HDL of 41 and triglycerides of 288.  She should be able to have much lower values now since the surgery with significant weight loss at this is to be addressed by her primary care provider.  The goal will be an LDL equal to or less than 100 with triglycerides equal to less than 200.     Patient remains on multivitamin since the gastric sleeve procedure    Blood pressure is 116/84 and she is afebrile and in normal sinus rhythm.           Review of Systems   All other systems reviewed and are negative.        Objective:      Physical Exam  Vitals and nursing note reviewed.   Constitutional:       General: She is not in acute distress.     Appearance: Normal appearance. She is obese. She is not ill-appearing, toxic-appearing or diaphoretic.   HENT:      Head: Normocephalic and atraumatic.   Skin:     General: Skin is warm and dry.      Coloration: Skin is not jaundiced or pale.   Neurological:      Mental Status: She is alert. Mental status is at baseline.         Assessment:       Problem List Items Addressed This Visit        Cardiac/Vascular    Hyperlipidemia       Renal/    Polycystic kidney disease - Primary    Stage 3a chronic kidney disease      Other Visit Diagnoses     Acute cystitis with hematuria        Relevant Medications    cefUROXime (CEFTIN) 250 MG tablet    Other Relevant Orders    POCT urine dipstick without microscope (Completed)    H/O gastric sleeve              Plan:       For this urinary tract infection with mild hematuria patient will be started empirically on Ceftin 250 mg 1 twice a day for 10 doses.  The prescription has been issued to the local [facility] pharmacy at patient's request.  No urine culture was sent.      She is to continue follow-up with primary care provider Dr. Valdez as it pertains to  her hypercholesterolemia on metabolic syndrome management.    Patient is to continue follow-up with nephrology with  regarding her polycystic kidney disease.      Pt is well aware of the signs and symptoms to watch for and to seek medical attention in case these appear

## 2022-09-26 ENCOUNTER — PATIENT MESSAGE (OUTPATIENT)
Dept: FAMILY MEDICINE | Facility: CLINIC | Age: 47
End: 2022-09-26
Payer: COMMERCIAL

## 2022-10-26 ENCOUNTER — TELEPHONE (OUTPATIENT)
Dept: FAMILY MEDICINE | Facility: CLINIC | Age: 47
End: 2022-10-26
Payer: COMMERCIAL

## 2022-10-27 ENCOUNTER — TELEPHONE (OUTPATIENT)
Dept: FAMILY MEDICINE | Facility: CLINIC | Age: 47
End: 2022-10-27
Payer: COMMERCIAL

## 2022-11-03 NOTE — LETTER
November 3, 2022    Anyi Moore  [address] [place] [address]             [facility]  [address] [place] [address] [place] [address]  Phone: [phone] Dear Mrs. Anyi Moore:    We are sorry that you missed your appointment with on 11/3/2022. Your health and follow-up medical care are important to us. Please call our office as soon as possible so that we may reschedule your appointment. If you have already rescheduled your appointment, please disregard this letter.    Sincerely,        Fran Arenas MD

## 2022-11-03 NOTE — PROGRESS NOTES
No show letter sent    I will STOP taking the medications listed below when I get home from the hospital:  None

## 2023-01-18 ENCOUNTER — PATIENT MESSAGE (OUTPATIENT)
Dept: ADMINISTRATIVE | Facility: HOSPITAL | Age: 48
End: 2023-01-18
Payer: COMMERCIAL

## 2023-02-06 DIAGNOSIS — K21.9 GASTROESOPHAGEAL REFLUX DISEASE WITHOUT ESOPHAGITIS: ICD-10-CM

## 2023-02-08 RX ORDER — PANTOPRAZOLE SODIUM 40 MG/1
40 TABLET, DELAYED RELEASE ORAL DAILY
Qty: 90 TABLET | Refills: 0 | Status: SHIPPED | OUTPATIENT
Start: 2023-02-08 | End: 2023-05-09

## 2023-04-12 ENCOUNTER — PATIENT MESSAGE (OUTPATIENT)
Dept: ADMINISTRATIVE | Facility: HOSPITAL | Age: 48
End: 2023-04-12
Payer: COMMERCIAL

## 2023-04-29 DIAGNOSIS — Z00.00 ANNUAL PHYSICAL EXAM: Primary | ICD-10-CM

## 2023-04-29 DIAGNOSIS — I10 ESSENTIAL HYPERTENSION: ICD-10-CM

## 2023-04-29 DIAGNOSIS — Z30.011 ORAL CONTRACEPTION INITIAL PRESCRIPTION: ICD-10-CM

## 2023-04-29 DIAGNOSIS — F41.1 GAD (GENERALIZED ANXIETY DISORDER): ICD-10-CM

## 2023-05-01 NOTE — TELEPHONE ENCOUNTER
I have not seen her in over 4 years. I cannot give her refills if I have not seen her recently. Dr. Cunningham's did her wellness exam this past August. Can send request to her.

## 2023-05-02 NOTE — TELEPHONE ENCOUNTER
Pt is overdue for labs and office visit.  She saw you last January for her annual.  She is scheduled to see Karla on 5/10.  Please advise regarding labs and refills.

## 2023-05-03 RX ORDER — OLMESARTAN MEDOXOMIL 40 MG/1
40 TABLET ORAL DAILY
Qty: 90 TABLET | Refills: 3 | Status: SHIPPED | OUTPATIENT
Start: 2023-05-03

## 2023-05-03 RX ORDER — VALACYCLOVIR HYDROCHLORIDE 1 G/1
1000 TABLET, FILM COATED ORAL 2 TIMES DAILY
Qty: 14 TABLET | Refills: 3 | Status: SHIPPED | OUTPATIENT
Start: 2023-05-03 | End: 2024-05-02

## 2023-05-03 RX ORDER — HYDROCHLOROTHIAZIDE 25 MG/1
25 TABLET ORAL DAILY
Qty: 90 TABLET | Refills: 3 | Status: SHIPPED | OUTPATIENT
Start: 2023-05-03

## 2023-05-03 RX ORDER — CITALOPRAM 20 MG/1
20 TABLET, FILM COATED ORAL DAILY
Qty: 90 TABLET | Refills: 3 | Status: SHIPPED | OUTPATIENT
Start: 2023-05-03

## 2023-05-03 RX ORDER — NORGESTIMATE AND ETHINYL ESTRADIOL 7DAYSX3 LO
1 KIT ORAL DAILY
Qty: 84 TABLET | Refills: 0 | Status: SHIPPED | OUTPATIENT
Start: 2023-05-03 | End: 2023-07-23

## 2023-05-03 RX ORDER — ROSUVASTATIN CALCIUM 40 MG/1
40 TABLET, COATED ORAL NIGHTLY
Qty: 90 TABLET | Refills: 3 | Status: SHIPPED | OUTPATIENT
Start: 2023-05-03 | End: 2024-05-02

## 2023-07-22 DIAGNOSIS — Z30.011 ORAL CONTRACEPTION INITIAL PRESCRIPTION: ICD-10-CM

## 2023-07-22 NOTE — TELEPHONE ENCOUNTER
Refill Routing Note   Medication(s) are not appropriate for processing by [facility] for the following reason(s):       Mammography is up-to-date per [facility]    [facility] action(s):  Defer Care Due:  None identified     Medication Therapy Plan: Failed protocol:  Mammography is not up-to-date per Health Maintenance      Appointments  past 12m or future 3m with PCP    Date Provider   Last Visit   1/28/2022 Nae Valdez, DO   Next Visit   Visit date not found Nae Valdez DO   ED visits in past 90 days: 0        Note composed:5:36 PM 07/22/2023

## 2023-07-23 RX ORDER — NORGESTIMATE AND ETHINYL ESTRADIOL 7DAYSX3 LO
KIT ORAL
Qty: 84 TABLET | Refills: 0 | Status: SHIPPED | OUTPATIENT
Start: 2023-07-23

## 2023-07-26 DIAGNOSIS — Z12.31 OTHER SCREENING MAMMOGRAM: ICD-10-CM

## 2023-08-02 DIAGNOSIS — Z12.31 OTHER SCREENING MAMMOGRAM: ICD-10-CM
